# Patient Record
Sex: MALE | Race: WHITE | NOT HISPANIC OR LATINO
[De-identification: names, ages, dates, MRNs, and addresses within clinical notes are randomized per-mention and may not be internally consistent; named-entity substitution may affect disease eponyms.]

---

## 2018-03-16 ENCOUNTER — APPOINTMENT (OUTPATIENT)
Dept: UROLOGY | Facility: CLINIC | Age: 38
End: 2018-03-16

## 2020-12-18 ENCOUNTER — TRANSCRIPTION ENCOUNTER (OUTPATIENT)
Age: 40
End: 2020-12-18

## 2021-03-18 ENCOUNTER — TRANSCRIPTION ENCOUNTER (OUTPATIENT)
Age: 41
End: 2021-03-18

## 2021-04-13 ENCOUNTER — TRANSCRIPTION ENCOUNTER (OUTPATIENT)
Age: 41
End: 2021-04-13

## 2021-04-15 ENCOUNTER — TRANSCRIPTION ENCOUNTER (OUTPATIENT)
Age: 41
End: 2021-04-15

## 2021-04-20 ENCOUNTER — APPOINTMENT (OUTPATIENT)
Dept: UROLOGY | Facility: CLINIC | Age: 41
End: 2021-04-20
Payer: MEDICAID

## 2021-04-20 VITALS — BODY MASS INDEX: 34.95 KG/M2 | WEIGHT: 258 LBS | HEIGHT: 72 IN

## 2021-04-20 DIAGNOSIS — R80.9 PROTEINURIA, UNSPECIFIED: ICD-10-CM

## 2021-04-20 DIAGNOSIS — M54.5 LOW BACK PAIN: ICD-10-CM

## 2021-04-20 DIAGNOSIS — Z78.9 OTHER SPECIFIED HEALTH STATUS: ICD-10-CM

## 2021-04-20 DIAGNOSIS — Z87.891 PERSONAL HISTORY OF NICOTINE DEPENDENCE: ICD-10-CM

## 2021-04-20 PROCEDURE — 99204 OFFICE O/P NEW MOD 45 MIN: CPT

## 2021-04-20 PROCEDURE — 99072 ADDL SUPL MATRL&STAF TM PHE: CPT

## 2021-04-27 LAB
BACTERIA UR CULT: NORMAL
URINE CYTOLOGY: NORMAL

## 2021-06-01 ENCOUNTER — OUTPATIENT (OUTPATIENT)
Dept: OUTPATIENT SERVICES | Facility: HOSPITAL | Age: 41
LOS: 1 days | Discharge: HOME | End: 2021-06-01
Payer: MEDICAID

## 2021-06-01 ENCOUNTER — RESULT REVIEW (OUTPATIENT)
Age: 41
End: 2021-06-01

## 2021-06-01 DIAGNOSIS — R31.29 OTHER MICROSCOPIC HEMATURIA: ICD-10-CM

## 2021-06-01 PROCEDURE — 74178 CT ABD&PLV WO CNTR FLWD CNTR: CPT | Mod: 26

## 2021-06-02 ENCOUNTER — NON-APPOINTMENT (OUTPATIENT)
Age: 41
End: 2021-06-02

## 2021-06-10 ENCOUNTER — APPOINTMENT (OUTPATIENT)
Dept: UROLOGY | Facility: CLINIC | Age: 41
End: 2021-06-10
Payer: MEDICAID

## 2021-06-10 VITALS — BODY MASS INDEX: 34.19 KG/M2 | TEMPERATURE: 98.3 F | WEIGHT: 258 LBS | HEIGHT: 73 IN

## 2021-06-10 DIAGNOSIS — R31.29 OTHER MICROSCOPIC HEMATURIA: ICD-10-CM

## 2021-06-10 PROCEDURE — 99214 OFFICE O/P EST MOD 30 MIN: CPT

## 2021-06-10 PROCEDURE — 99072 ADDL SUPL MATRL&STAF TM PHE: CPT

## 2021-06-22 ENCOUNTER — APPOINTMENT (OUTPATIENT)
Dept: UROLOGY | Facility: CLINIC | Age: 41
End: 2021-06-22
Payer: MEDICAID

## 2021-06-22 PROCEDURE — 99214 OFFICE O/P EST MOD 30 MIN: CPT

## 2021-06-22 PROCEDURE — 99072 ADDL SUPL MATRL&STAF TM PHE: CPT

## 2021-06-22 NOTE — HISTORY OF PRESENT ILLNESS
[FreeTextEntry1] : This is a 40 year old male who is being evaluated for finding of right lower pole renal mass.\par \par He was being followed for microscopic hematuria \par UA on 3/10/21 with > 60 RBC/HPF\par \par Underwent CT on 6/2021 which showed enhancing 3.6 x  3.7 cm right medial lower pole mass along with multiple hypoattenuating hepatic lesions.  He is currently scheduled for an MRI to rule out metastasis.\par 4 mm nonobstructing  left renal calculus also noted.\par \par Negative urine cytology\par \par TT ~30\par On testosterone replacement being managed by his PCP.  He injects 1.5 ml testosterone weekly\par Libido "so so"\par \par \par \par Medical Hx: Low Testosterone, hypothyroidism\par Surgical Hx: lap Gastric sleeve 1 year ago, has lost about 110 lbs\par

## 2021-06-22 NOTE — ASSESSMENT
[FreeTextEntry1] : 40 year old male with recent finding of enhancing 3.7 cm right renal mass\par -MRI to evaluate for metastasis\par -pre-operative labs today\par -chest Xray \par \par We reviewed the possible underlying histology of solid enhancing renal masses, with the majority being malignant (~80%) whereas ~20% are benign (e.g. oncocytoma).  We discussed the role/possibility of percutaneous biopsy, with the associated risks, benefits, complications, and accuracy issues (e.g. risk of false negative results). \par \par The heterogeneous natural history/biology of renal cell carcinoma was discussed, including the fact that while many renal cancers are indolent, others behave aggresssively.  \par \par Options were reviewed including, not limited to, active surveillance (AS), surgical extirpation and ablation.  The risks of tumor growth and metastasis on active surveillance were reviewed, including the fact that metastatic progression on AS could mean missing the opportunity for cure.  The average growth rate of ~2-3 mm/year and metastasis rates of ~2-3% on AS over 5 year interval for small renal masses <4 cm was discussed. \par \par With respect to treatment we reviewed ablation (cryosurgery, radiofrequency ablation), risks of recurrence, opportunities for salvage treatment, and imaging requirements for follow up.  Oncologic outcomes for ablation were reviewed. \par \par With respect to surgery we reviewed nephron sparing surgery vs. radical nephrectomy, as well as open vs. minimally invasive surgical (MIS) approaches (e.g. laparoscopy and robotic assisted laparoscopic surgery).  Personal and institutional experience, as well as other published literature was reviewed.  Oncologic outcomes, renal functional outcomes were compared and contrasted between radical vs. NSS and open vs. MIS surgery.  Risks of acute kidney injury, medical/surgical chronic kidney disease (either exacerbation or new-onset), as well as risks of ESRD development were reviewed.   Risks of conversion from MIS to open surgery discussed.  Risks of converting from attempted partial nephrectomy to radical nephrectomy were discussed.  Risks of surgical complications were reviewed, including not limited to: bleeding//life-threatening hemorrhage, vascular/bowel/adjacent visceral organ injury, trocar/access injury, the possibility of recognized vs. unrecognized/delayed-recognition injury, risks of thermal/blunt/sharp/retraction injury, risks of DVT, PE, MI, death, risks of cardiopulmonary/anesthesia related complications, positional injury, infection/collection/abscess, wound complications/dehiscence/seroma/cellulitis, urinoma/fistula, ureteral injury/obstruction, as well as other complications\par

## 2021-06-23 LAB
ALBUMIN SERPL ELPH-MCNC: 4.7 G/DL
ALP BLD-CCNC: 85 U/L
ALT SERPL-CCNC: 39 U/L
ANION GAP SERPL CALC-SCNC: 11 MMOL/L
APPEARANCE: CLEAR
AST SERPL-CCNC: 30 U/L
BACTERIA: NEGATIVE
BASOPHILS # BLD AUTO: 0.05 K/UL
BASOPHILS NFR BLD AUTO: 0.8 %
BILIRUB SERPL-MCNC: 0.4 MG/DL
BILIRUBIN URINE: NEGATIVE
BLOOD URINE: ABNORMAL
BUN SERPL-MCNC: 9 MG/DL
CALCIUM SERPL-MCNC: 9.6 MG/DL
CHLORIDE SERPL-SCNC: 103 MMOL/L
CO2 SERPL-SCNC: 24 MMOL/L
COLOR: YELLOW
CREAT SERPL-MCNC: 1.06 MG/DL
EOSINOPHIL # BLD AUTO: 0.05 K/UL
EOSINOPHIL NFR BLD AUTO: 0.8 %
GLUCOSE QUALITATIVE U: NEGATIVE
GLUCOSE SERPL-MCNC: 108 MG/DL
HCT VFR BLD CALC: 51.7 %
HGB BLD-MCNC: 16.6 G/DL
HYALINE CASTS: 0 /LPF
IMM GRANULOCYTES NFR BLD AUTO: 0.3 %
KETONES URINE: NEGATIVE
LEUKOCYTE ESTERASE URINE: NEGATIVE
LYMPHOCYTES # BLD AUTO: 1.93 K/UL
LYMPHOCYTES NFR BLD AUTO: 32.8 %
MAN DIFF?: NORMAL
MCHC RBC-ENTMCNC: 29.3 PG
MCHC RBC-ENTMCNC: 32.1 GM/DL
MCV RBC AUTO: 91.2 FL
MICROSCOPIC-UA: NORMAL
MONOCYTES # BLD AUTO: 0.55 K/UL
MONOCYTES NFR BLD AUTO: 9.3 %
NEUTROPHILS # BLD AUTO: 3.29 K/UL
NEUTROPHILS NFR BLD AUTO: 56 %
NITRITE URINE: NEGATIVE
PH URINE: 8
PLATELET # BLD AUTO: 253 K/UL
POTASSIUM SERPL-SCNC: 4.6 MMOL/L
PROT SERPL-MCNC: 7.1 G/DL
PROTEIN URINE: ABNORMAL
RBC # BLD: 5.67 M/UL
RBC # FLD: 14.6 %
RED BLOOD CELLS URINE: 400 /HPF
SODIUM SERPL-SCNC: 138 MMOL/L
SPECIFIC GRAVITY URINE: 1.03
SQUAMOUS EPITHELIAL CELLS: 3 /HPF
UROBILINOGEN URINE: ABNORMAL
WBC # FLD AUTO: 5.89 K/UL
WHITE BLOOD CELLS URINE: 1 /HPF

## 2021-06-24 LAB — BACTERIA UR CULT: NORMAL

## 2021-06-28 ENCOUNTER — APPOINTMENT (OUTPATIENT)
Dept: UROLOGY | Facility: CLINIC | Age: 41
End: 2021-06-28

## 2021-06-28 ENCOUNTER — LABORATORY RESULT (OUTPATIENT)
Age: 41
End: 2021-06-28

## 2021-07-03 ENCOUNTER — OUTPATIENT (OUTPATIENT)
Dept: OUTPATIENT SERVICES | Facility: HOSPITAL | Age: 41
LOS: 1 days | Discharge: HOME | End: 2021-07-03
Payer: MEDICAID

## 2021-07-03 ENCOUNTER — RESULT REVIEW (OUTPATIENT)
Age: 41
End: 2021-07-03

## 2021-07-03 DIAGNOSIS — N28.89 OTHER SPECIFIED DISORDERS OF KIDNEY AND URETER: ICD-10-CM

## 2021-07-03 PROCEDURE — 74183 MRI ABD W/O CNTR FLWD CNTR: CPT | Mod: 26

## 2021-07-03 PROCEDURE — 72197 MRI PELVIS W/O & W/DYE: CPT | Mod: 26

## 2021-07-06 ENCOUNTER — TRANSCRIPTION ENCOUNTER (OUTPATIENT)
Age: 41
End: 2021-07-06

## 2021-07-06 VITALS
TEMPERATURE: 98 F | HEIGHT: 73 IN | OXYGEN SATURATION: 96 % | RESPIRATION RATE: 16 BRPM | DIASTOLIC BLOOD PRESSURE: 78 MMHG | WEIGHT: 271.39 LBS | SYSTOLIC BLOOD PRESSURE: 137 MMHG | HEART RATE: 77 BPM

## 2021-07-07 ENCOUNTER — RESULT REVIEW (OUTPATIENT)
Age: 41
End: 2021-07-07

## 2021-07-07 ENCOUNTER — INPATIENT (INPATIENT)
Facility: HOSPITAL | Age: 41
LOS: 0 days | Discharge: HOME | DRG: 658 | End: 2021-07-08
Attending: UROLOGY | Admitting: UROLOGY
Payer: MEDICAID

## 2021-07-07 ENCOUNTER — APPOINTMENT (OUTPATIENT)
Dept: UROLOGY | Facility: HOSPITAL | Age: 41
End: 2021-07-07

## 2021-07-07 DIAGNOSIS — C64.1 MALIGNANT NEOPLASM OF RIGHT KIDNEY, EXCEPT RENAL PELVIS: ICD-10-CM

## 2021-07-07 DIAGNOSIS — N28.89 OTHER SPECIFIED DISORDERS OF KIDNEY AND URETER: ICD-10-CM

## 2021-07-07 DIAGNOSIS — Z98.84 BARIATRIC SURGERY STATUS: Chronic | ICD-10-CM

## 2021-07-07 DIAGNOSIS — E03.9 HYPOTHYROIDISM, UNSPECIFIED: ICD-10-CM

## 2021-07-07 LAB
ANION GAP SERPL CALC-SCNC: 12 MMOL/L — SIGNIFICANT CHANGE UP (ref 5–17)
BLD GP AB SCN SERPL QL: NEGATIVE — SIGNIFICANT CHANGE UP
BUN SERPL-MCNC: 16 MG/DL — SIGNIFICANT CHANGE UP (ref 7–23)
CALCIUM SERPL-MCNC: 8.5 MG/DL — SIGNIFICANT CHANGE UP (ref 8.4–10.5)
CHLORIDE SERPL-SCNC: 102 MMOL/L — SIGNIFICANT CHANGE UP (ref 96–108)
CO2 SERPL-SCNC: 24 MMOL/L — SIGNIFICANT CHANGE UP (ref 22–31)
CREAT SERPL-MCNC: 1.13 MG/DL — SIGNIFICANT CHANGE UP (ref 0.5–1.3)
GLUCOSE SERPL-MCNC: 99 MG/DL — SIGNIFICANT CHANGE UP (ref 70–99)
HCT VFR BLD CALC: 44.3 % — SIGNIFICANT CHANGE UP (ref 39–50)
HGB BLD-MCNC: 14.6 G/DL — SIGNIFICANT CHANGE UP (ref 13–17)
MAGNESIUM SERPL-MCNC: 1.7 MG/DL — SIGNIFICANT CHANGE UP (ref 1.6–2.6)
MCHC RBC-ENTMCNC: 29.8 PG — SIGNIFICANT CHANGE UP (ref 27–34)
MCHC RBC-ENTMCNC: 33 GM/DL — SIGNIFICANT CHANGE UP (ref 32–36)
MCV RBC AUTO: 90.4 FL — SIGNIFICANT CHANGE UP (ref 80–100)
NRBC # BLD: 0 /100 WBCS — SIGNIFICANT CHANGE UP (ref 0–0)
PHOSPHATE SERPL-MCNC: 2.7 MG/DL — SIGNIFICANT CHANGE UP (ref 2.5–4.5)
PLATELET # BLD AUTO: 230 K/UL — SIGNIFICANT CHANGE UP (ref 150–400)
POTASSIUM SERPL-MCNC: 4.2 MMOL/L — SIGNIFICANT CHANGE UP (ref 3.5–5.3)
POTASSIUM SERPL-SCNC: 4.2 MMOL/L — SIGNIFICANT CHANGE UP (ref 3.5–5.3)
RBC # BLD: 4.9 M/UL — SIGNIFICANT CHANGE UP (ref 4.2–5.8)
RBC # FLD: 14 % — SIGNIFICANT CHANGE UP (ref 10.3–14.5)
RH IG SCN BLD-IMP: POSITIVE — SIGNIFICANT CHANGE UP
SODIUM SERPL-SCNC: 138 MMOL/L — SIGNIFICANT CHANGE UP (ref 135–145)
WBC # BLD: 13.21 K/UL — HIGH (ref 3.8–10.5)
WBC # FLD AUTO: 13.21 K/UL — HIGH (ref 3.8–10.5)

## 2021-07-07 PROCEDURE — 50543 LAPARO PARTIAL NEPHRECTOMY: CPT | Mod: RT

## 2021-07-07 PROCEDURE — 76998 US GUIDE INTRAOP: CPT | Mod: 26

## 2021-07-07 PROCEDURE — 88342 IMHCHEM/IMCYTCHM 1ST ANTB: CPT | Mod: 26

## 2021-07-07 PROCEDURE — 88307 TISSUE EXAM BY PATHOLOGIST: CPT | Mod: 26

## 2021-07-07 PROCEDURE — 50543 LAPARO PARTIAL NEPHRECTOMY: CPT | Mod: AS

## 2021-07-07 PROCEDURE — 88341 IMHCHEM/IMCYTCHM EA ADD ANTB: CPT | Mod: 26

## 2021-07-07 PROCEDURE — S2900 ROBOTIC SURGICAL SYSTEM: CPT

## 2021-07-07 RX ORDER — CEFAZOLIN SODIUM 1 G
1000 VIAL (EA) INJECTION EVERY 8 HOURS
Refills: 0 | Status: DISCONTINUED | OUTPATIENT
Start: 2021-07-07 | End: 2021-07-08

## 2021-07-07 RX ORDER — ONDANSETRON 8 MG/1
8 TABLET, FILM COATED ORAL EVERY 8 HOURS
Refills: 0 | Status: DISCONTINUED | OUTPATIENT
Start: 2021-07-07 | End: 2021-07-07

## 2021-07-07 RX ORDER — SODIUM CHLORIDE 9 MG/ML
1000 INJECTION, SOLUTION INTRAVENOUS
Refills: 0 | Status: DISCONTINUED | OUTPATIENT
Start: 2021-07-07 | End: 2021-07-08

## 2021-07-07 RX ORDER — SENNA PLUS 8.6 MG/1
2 TABLET ORAL AT BEDTIME
Refills: 0 | Status: DISCONTINUED | OUTPATIENT
Start: 2021-07-07 | End: 2021-07-08

## 2021-07-07 RX ORDER — GABAPENTIN 400 MG/1
300 CAPSULE ORAL ONCE
Refills: 0 | Status: COMPLETED | OUTPATIENT
Start: 2021-07-07 | End: 2021-07-07

## 2021-07-07 RX ORDER — ENOXAPARIN SODIUM 100 MG/ML
40 INJECTION SUBCUTANEOUS ONCE
Refills: 0 | Status: COMPLETED | OUTPATIENT
Start: 2021-07-07 | End: 2021-07-07

## 2021-07-07 RX ORDER — CEFAZOLIN SODIUM 1 G
1000 VIAL (EA) INJECTION EVERY 8 HOURS
Refills: 0 | Status: DISCONTINUED | OUTPATIENT
Start: 2021-07-07 | End: 2021-07-07

## 2021-07-07 RX ORDER — ENOXAPARIN SODIUM 100 MG/ML
40 INJECTION SUBCUTANEOUS DAILY
Refills: 0 | Status: DISCONTINUED | OUTPATIENT
Start: 2021-07-08 | End: 2021-07-08

## 2021-07-07 RX ORDER — ONDANSETRON 8 MG/1
4 TABLET, FILM COATED ORAL EVERY 8 HOURS
Refills: 0 | Status: DISCONTINUED | OUTPATIENT
Start: 2021-07-07 | End: 2021-07-08

## 2021-07-07 RX ORDER — HYDROMORPHONE HYDROCHLORIDE 2 MG/ML
0.5 INJECTION INTRAMUSCULAR; INTRAVENOUS; SUBCUTANEOUS ONCE
Refills: 0 | Status: DISCONTINUED | OUTPATIENT
Start: 2021-07-07 | End: 2021-07-07

## 2021-07-07 RX ORDER — HYDROMORPHONE HYDROCHLORIDE 2 MG/ML
0.5 INJECTION INTRAMUSCULAR; INTRAVENOUS; SUBCUTANEOUS EVERY 6 HOURS
Refills: 0 | Status: DISCONTINUED | OUTPATIENT
Start: 2021-07-07 | End: 2021-07-07

## 2021-07-07 RX ORDER — ACETAMINOPHEN 500 MG
1000 TABLET ORAL EVERY 6 HOURS
Refills: 0 | Status: DISCONTINUED | OUTPATIENT
Start: 2021-07-07 | End: 2021-07-08

## 2021-07-07 RX ORDER — OXYCODONE HYDROCHLORIDE 5 MG/1
5 TABLET ORAL EVERY 4 HOURS
Refills: 0 | Status: DISCONTINUED | OUTPATIENT
Start: 2021-07-07 | End: 2021-07-08

## 2021-07-07 RX ORDER — ACETAMINOPHEN 500 MG
1000 TABLET ORAL ONCE
Refills: 0 | Status: COMPLETED | OUTPATIENT
Start: 2021-07-07 | End: 2021-07-07

## 2021-07-07 RX ADMIN — GABAPENTIN 300 MILLIGRAM(S): 400 CAPSULE ORAL at 14:21

## 2021-07-07 RX ADMIN — ENOXAPARIN SODIUM 40 MILLIGRAM(S): 100 INJECTION SUBCUTANEOUS at 14:21

## 2021-07-07 RX ADMIN — Medication 1000 MILLIGRAM(S): at 19:41

## 2021-07-07 RX ADMIN — SENNA PLUS 2 TABLET(S): 8.6 TABLET ORAL at 21:28

## 2021-07-07 RX ADMIN — SODIUM CHLORIDE 130 MILLILITER(S): 9 INJECTION, SOLUTION INTRAVENOUS at 19:23

## 2021-07-07 RX ADMIN — Medication 1000 MILLIGRAM(S): at 14:24

## 2021-07-07 RX ADMIN — Medication 1000 MILLIGRAM(S): at 14:20

## 2021-07-07 RX ADMIN — HYDROMORPHONE HYDROCHLORIDE 0.5 MILLIGRAM(S): 2 INJECTION INTRAMUSCULAR; INTRAVENOUS; SUBCUTANEOUS at 23:00

## 2021-07-07 RX ADMIN — OXYCODONE HYDROCHLORIDE 5 MILLIGRAM(S): 5 TABLET ORAL at 19:41

## 2021-07-07 RX ADMIN — HYDROMORPHONE HYDROCHLORIDE 0.5 MILLIGRAM(S): 2 INJECTION INTRAMUSCULAR; INTRAVENOUS; SUBCUTANEOUS at 19:23

## 2021-07-07 RX ADMIN — Medication 100 MILLIGRAM(S): at 22:29

## 2021-07-08 ENCOUNTER — TRANSCRIPTION ENCOUNTER (OUTPATIENT)
Age: 41
End: 2021-07-08

## 2021-07-08 VITALS
OXYGEN SATURATION: 95 % | RESPIRATION RATE: 18 BRPM | DIASTOLIC BLOOD PRESSURE: 67 MMHG | HEART RATE: 85 BPM | SYSTOLIC BLOOD PRESSURE: 114 MMHG | TEMPERATURE: 98 F

## 2021-07-08 LAB
ANION GAP SERPL CALC-SCNC: 11 MMOL/L — SIGNIFICANT CHANGE UP (ref 5–17)
BUN SERPL-MCNC: 13 MG/DL — SIGNIFICANT CHANGE UP (ref 7–23)
CALCIUM SERPL-MCNC: 8.9 MG/DL — SIGNIFICANT CHANGE UP (ref 8.4–10.5)
CHLORIDE SERPL-SCNC: 99 MMOL/L — SIGNIFICANT CHANGE UP (ref 96–108)
CO2 SERPL-SCNC: 24 MMOL/L — SIGNIFICANT CHANGE UP (ref 22–31)
COVID-19 SPIKE DOMAIN AB INTERP: POSITIVE
COVID-19 SPIKE DOMAIN ANTIBODY RESULT: >250 U/ML — HIGH
CREAT SERPL-MCNC: 1.15 MG/DL — SIGNIFICANT CHANGE UP (ref 0.5–1.3)
GLUCOSE SERPL-MCNC: 145 MG/DL — HIGH (ref 70–99)
HCT VFR BLD CALC: 44.4 % — SIGNIFICANT CHANGE UP (ref 39–50)
HGB BLD-MCNC: 14.5 G/DL — SIGNIFICANT CHANGE UP (ref 13–17)
MCHC RBC-ENTMCNC: 29.2 PG — SIGNIFICANT CHANGE UP (ref 27–34)
MCHC RBC-ENTMCNC: 32.7 GM/DL — SIGNIFICANT CHANGE UP (ref 32–36)
MCV RBC AUTO: 89.5 FL — SIGNIFICANT CHANGE UP (ref 80–100)
NRBC # BLD: 0 /100 WBCS — SIGNIFICANT CHANGE UP (ref 0–0)
PLATELET # BLD AUTO: 265 K/UL — SIGNIFICANT CHANGE UP (ref 150–400)
POTASSIUM SERPL-MCNC: 5.3 MMOL/L — SIGNIFICANT CHANGE UP (ref 3.5–5.3)
POTASSIUM SERPL-SCNC: 5.3 MMOL/L — SIGNIFICANT CHANGE UP (ref 3.5–5.3)
RBC # BLD: 4.96 M/UL — SIGNIFICANT CHANGE UP (ref 4.2–5.8)
RBC # FLD: 13.8 % — SIGNIFICANT CHANGE UP (ref 10.3–14.5)
SARS-COV-2 IGG+IGM SERPL QL IA: >250 U/ML — HIGH
SARS-COV-2 IGG+IGM SERPL QL IA: POSITIVE
SODIUM SERPL-SCNC: 134 MMOL/L — LOW (ref 135–145)
WBC # BLD: 10.52 K/UL — HIGH (ref 3.8–10.5)
WBC # FLD AUTO: 10.52 K/UL — HIGH (ref 3.8–10.5)

## 2021-07-08 PROCEDURE — S2900: CPT

## 2021-07-08 PROCEDURE — 88341 IMHCHEM/IMCYTCHM EA ADD ANTB: CPT

## 2021-07-08 PROCEDURE — 85027 COMPLETE CBC AUTOMATED: CPT

## 2021-07-08 PROCEDURE — C1889: CPT

## 2021-07-08 PROCEDURE — 86900 BLOOD TYPING SEROLOGIC ABO: CPT

## 2021-07-08 PROCEDURE — 88307 TISSUE EXAM BY PATHOLOGIST: CPT

## 2021-07-08 PROCEDURE — 86901 BLOOD TYPING SEROLOGIC RH(D): CPT

## 2021-07-08 PROCEDURE — 36415 COLL VENOUS BLD VENIPUNCTURE: CPT

## 2021-07-08 PROCEDURE — 86769 SARS-COV-2 COVID-19 ANTIBODY: CPT

## 2021-07-08 PROCEDURE — 83735 ASSAY OF MAGNESIUM: CPT

## 2021-07-08 PROCEDURE — 84100 ASSAY OF PHOSPHORUS: CPT

## 2021-07-08 PROCEDURE — 86850 RBC ANTIBODY SCREEN: CPT

## 2021-07-08 PROCEDURE — 80048 BASIC METABOLIC PNL TOTAL CA: CPT

## 2021-07-08 RX ORDER — OXYCODONE HYDROCHLORIDE 5 MG/1
1 TABLET ORAL
Qty: 10 | Refills: 0
Start: 2021-07-08

## 2021-07-08 RX ORDER — OXYCODONE HYDROCHLORIDE 5 MG/1
5 TABLET ORAL ONCE
Refills: 0 | Status: DISCONTINUED | OUTPATIENT
Start: 2021-07-08 | End: 2021-07-08

## 2021-07-08 RX ORDER — DOCUSATE SODIUM 100 MG
1 CAPSULE ORAL
Qty: 18 | Refills: 0
Start: 2021-07-08 | End: 2021-07-13

## 2021-07-08 RX ORDER — LEVOTHYROXINE SODIUM 125 MCG
100 TABLET ORAL DAILY
Refills: 0 | Status: DISCONTINUED | OUTPATIENT
Start: 2021-07-08 | End: 2021-07-08

## 2021-07-08 RX ORDER — TRAMADOL HYDROCHLORIDE 50 MG/1
1 TABLET ORAL
Qty: 10 | Refills: 0
Start: 2021-07-08

## 2021-07-08 RX ADMIN — Medication 1000 MILLIGRAM(S): at 02:16

## 2021-07-08 RX ADMIN — OXYCODONE HYDROCHLORIDE 5 MILLIGRAM(S): 5 TABLET ORAL at 02:12

## 2021-07-08 RX ADMIN — OXYCODONE HYDROCHLORIDE 5 MILLIGRAM(S): 5 TABLET ORAL at 10:28

## 2021-07-08 RX ADMIN — OXYCODONE HYDROCHLORIDE 5 MILLIGRAM(S): 5 TABLET ORAL at 11:10

## 2021-07-08 RX ADMIN — Medication 1000 MILLIGRAM(S): at 01:10

## 2021-07-08 RX ADMIN — OXYCODONE HYDROCHLORIDE 5 MILLIGRAM(S): 5 TABLET ORAL at 06:25

## 2021-07-08 RX ADMIN — OXYCODONE HYDROCHLORIDE 5 MILLIGRAM(S): 5 TABLET ORAL at 08:00

## 2021-07-08 RX ADMIN — Medication 1000 MILLIGRAM(S): at 08:00

## 2021-07-08 RX ADMIN — Medication 100 MILLIGRAM(S): at 06:30

## 2021-07-08 RX ADMIN — OXYCODONE HYDROCHLORIDE 5 MILLIGRAM(S): 5 TABLET ORAL at 03:04

## 2021-07-08 RX ADMIN — Medication 1000 MILLIGRAM(S): at 07:02

## 2021-07-08 RX ADMIN — Medication 100 MICROGRAM(S): at 05:30

## 2021-07-08 RX ADMIN — HYDROMORPHONE HYDROCHLORIDE 0.5 MILLIGRAM(S): 2 INJECTION INTRAMUSCULAR; INTRAVENOUS; SUBCUTANEOUS at 00:32

## 2021-07-08 RX ADMIN — Medication 1000 MILLIGRAM(S): at 13:50

## 2021-07-08 RX ADMIN — Medication 1000 MILLIGRAM(S): at 13:19

## 2021-07-08 NOTE — DISCHARGE NOTE NURSING/CASE MANAGEMENT/SOCIAL WORK - PATIENT PORTAL LINK FT
You can access the FollowMyHealth Patient Portal offered by [facility] by registering at the following website: [email or web address]. By joining [facility]’s FollowMyHealth portal, you will also be able to view your health information using other applications (apps) compatible with our system.

## 2021-07-08 NOTE — PROGRESS NOTE ADULT - SUBJECTIVE AND OBJECTIVE BOX
UROLOGY POST OP NOTE (PAGER # [phone])    PROCEDURE: robotic right partial nephrectomy    T(C): 36.2 (07-07-21 @ 19:12), Max: 36.2 (07-07-21 @ 19:12)  HR: 89 (07-07-21 @ 20:30) (82 - 89)  BP: 113/68 (07-07-21 @ 20:30) (105/61 - 124/55)  RR: 20 (07-07-21 @ 20:30) (11 - 20)  SpO2: 100% (07-07-21 @ 20:30) (100% - 100%)  Wt(kg): --  UO: 95 cc    SUBJECTIVE: pain controlled. No N/V. No CP/SOB.    ON PE: alert and awake    Abdomen: soft, incision sites clean and dry    : FC intact, urine clear                          14.6   13.21 )-----------( 230      ( 07 Jul 2021 20:20 )             44.3     07-07    138  |  102  |  16  ----------------------------<  99  4.2   |  24  |  1.13    Ca    8.5      07 Jul 2021 20:20  Phos  2.7     07-07  Mg     1.7     07-07         
INTERVAL HPI/OVERNIGHT EVENTS:  No acute events overnight.    VITALS:    T(F): 98.1 (07-08-21 @ 00:18), Max: 98.1 (07-08-21 @ 00:18)  HR: 86 (07-08-21 @ 00:18) (82 - 89)  BP: 121/73 (07-08-21 @ 00:18) (105/61 - 131/73)  RR: 18 (07-08-21 @ 00:18) (11 - 20)  SpO2: 94% (07-08-21 @ 00:18) (94% - 100%)  Wt(kg): --    I&O's Detail    07 Jul 2021 07:01  -  08 Jul 2021 05:48  --------------------------------------------------------  IN:  Total IN: 0 mL    OUT:    Voided (mL): 95 mL  Total OUT: 95 mL    Total NET: -95 mL          MEDICATIONS:    ANTIBIOTICS:  ceFAZolin   IVPB 1000 milliGRAM(s) IV Intermittent every 8 hours      PAIN CONTROL:  acetaminophen   Tablet .. 1000 milliGRAM(s) Oral every 6 hours  ondansetron Injectable 4 milliGRAM(s) IV Push every 8 hours PRN  oxyCODONE    IR 5 milliGRAM(s) Oral every 4 hours PRN       MEDS:      HEME/ONC  enoxaparin Injectable 40 milliGRAM(s) SubCutaneous daily        PHYSICAL EXAM:  General: No acute distress.  Alert and Oriented  Abdominal Exam: soft, incision sites clean and dry   Exam: FC intact, urine clear      LABS:                        14.6   13.21 )-----------( 230      ( 07 Jul 2021 20:20 )             44.3     07-07    138  |  102  |  16  ----------------------------<  99  4.2   |  24  |  1.13    Ca    8.5      07 Jul 2021 20:20  Phos  2.7     07-07  Mg     1.7     07-07            RADIOLOGY & ADDITIONAL TESTS:

## 2021-07-08 NOTE — DISCHARGE NOTE PROVIDER - CARE PROVIDER_API CALL
[place])  Urology  [address] [address]  Phone: [phone]  Fax: [phone]  Follow Up Time:

## 2021-07-08 NOTE — DISCHARGE NOTE PROVIDER - NSDCCPCAREPLAN_GEN_ALL_CORE_FT
PRINCIPAL DISCHARGE DIAGNOSIS  Diagnosis: Renal mass  Assessment and Plan of Treatment:       SECONDARY DISCHARGE DIAGNOSES  Diagnosis: Hypothyroidism  Assessment and Plan of Treatment:

## 2021-07-08 NOTE — DISCHARGE NOTE PROVIDER - NSDCFUADDINST_GEN_ALL_CORE_FT
Advance diet with BM. Activity as tolerated. Keep incisions clean, dry and intact. If fever >100.4 or any change or worsening of symptoms please call doctor or report to ED. Make follow up appointment with Dr. Sargent. Please use tylenol/NSAIDS as needed for pain

## 2021-07-08 NOTE — PROGRESS NOTE ADULT - PROBLEM SELECTOR PLAN 1
-stable  -OOB  -IS, SCD's  -Diet: clears  -Antibx: ancef  -I's & O's  -pain control  -IVF's  -continue jose
-stable  -OOB  -SCD's, IS  -f/u labs  -possible TOV this am  -continue present care

## 2021-07-08 NOTE — DISCHARGE NOTE PROVIDER - NSDCMRMEDTOKEN_GEN_ALL_CORE_FT
Colace 100 mg oral capsule: 1 cap(s) orally 3 times a day, As Needed -for constipation MDD:Please take one tab up to three times a day as needed for constipation   Synthroid 100 mcg (0.1 mg) oral tablet: 1 tab(s) orally once a day   Colace 100 mg oral capsule: 1 cap(s) orally 3 times a day, As Needed -for constipation MDD:Please take one tab up to three times a day as needed for constipation   oxyCODONE 5 mg oral capsule: 1 cap(s) orally every 6 hours, As Needed -for moderate pain MDD:Please take one tab up to every 6 hours as needed for severe pain   Synthroid 100 mcg (0.1 mg) oral tablet: 1 tab(s) orally once a day

## 2021-07-09 PROBLEM — E07.9 DISORDER OF THYROID, UNSPECIFIED: Chronic | Status: ACTIVE | Noted: 2021-07-06

## 2021-07-09 PROBLEM — N28.89 OTHER SPECIFIED DISORDERS OF KIDNEY AND URETER: Chronic | Status: ACTIVE | Noted: 2021-07-06

## 2021-07-09 PROBLEM — C64.9 MALIGNANT NEOPLASM OF UNSPECIFIED KIDNEY, EXCEPT RENAL PELVIS: Chronic | Status: ACTIVE | Noted: 2021-07-06

## 2021-07-09 PROBLEM — G47.33 OBSTRUCTIVE SLEEP APNEA (ADULT) (PEDIATRIC): Chronic | Status: ACTIVE | Noted: 2021-07-06

## 2021-07-22 ENCOUNTER — APPOINTMENT (OUTPATIENT)
Dept: UROLOGY | Facility: CLINIC | Age: 41
End: 2021-07-22
Payer: MEDICAID

## 2021-07-22 VITALS
HEART RATE: 82 BPM | DIASTOLIC BLOOD PRESSURE: 83 MMHG | TEMPERATURE: 98 F | SYSTOLIC BLOOD PRESSURE: 132 MMHG | OXYGEN SATURATION: 98 %

## 2021-07-22 PROCEDURE — 99024 POSTOP FOLLOW-UP VISIT: CPT

## 2021-07-22 NOTE — ASSESSMENT
[FreeTextEntry1] : 40 year old male s/p right partial nephrectomy \par -pathology reviewed with patient, plan on CT in 6 months for surveillance imaging \par -Renal US given stone history and mild left flank pain to r/o obstructing stone, PVR was 2 cc \par -dip in office shows moderate blood, will send UA \par \par \par \par Can follow up with Dr. Parra in 6 months after surveillance CT

## 2021-07-22 NOTE — PHYSICAL EXAM
[General Appearance - Well Developed] : well developed [General Appearance - Well Nourished] : well nourished [Normal Appearance] : normal appearance [Well Groomed] : well groomed [General Appearance - In No Acute Distress] : no acute distress [Abdomen Soft] : soft [Abdomen Tenderness] : non-tender [Costovertebral Angle Tenderness] : no ~M costovertebral angle tenderness [No Focal Deficits] : no focal deficits

## 2021-07-22 NOTE — HISTORY OF PRESENT ILLNESS
[FreeTextEntry1] : 40 year old male s/p partial nephrectomy on 7/7/21\par Doing well since surgery\par \par No pleuritic pain, SOB, calf pain or swelling\par Passing gas and moving bowels, tolerating diet\par \par Voiding without difficulty reports "dark light ice tea color" urine in am that clears throughout the day.\par \par CT prior to surgery noted 4 mm left renal stone \par \par \par \par

## 2021-07-23 ENCOUNTER — RESULT REVIEW (OUTPATIENT)
Age: 41
End: 2021-07-23

## 2021-07-27 LAB — SURGICAL PATHOLOGY STUDY: SIGNIFICANT CHANGE UP

## 2021-07-28 LAB
APPEARANCE: CLEAR
BACTERIA: NEGATIVE
BILIRUBIN URINE: NEGATIVE
BLOOD URINE: ABNORMAL
COLOR: YELLOW
GLUCOSE QUALITATIVE U: NEGATIVE
HYALINE CASTS: 0 /LPF
KETONES URINE: NEGATIVE
LEUKOCYTE ESTERASE URINE: NEGATIVE
MICROSCOPIC-UA: NORMAL
NITRITE URINE: NEGATIVE
PH URINE: 5.5
PROTEIN URINE: NEGATIVE
RED BLOOD CELLS URINE: 97 /HPF
SPECIFIC GRAVITY URINE: 1.02
SQUAMOUS EPITHELIAL CELLS: 0 /HPF
UROBILINOGEN URINE: NORMAL
WHITE BLOOD CELLS URINE: 1 /HPF

## 2021-08-02 ENCOUNTER — NON-APPOINTMENT (OUTPATIENT)
Age: 41
End: 2021-08-02

## 2021-08-02 ENCOUNTER — OUTPATIENT (OUTPATIENT)
Dept: OUTPATIENT SERVICES | Facility: HOSPITAL | Age: 41
LOS: 1 days | Discharge: HOME | End: 2021-08-02
Payer: MEDICAID

## 2021-08-02 DIAGNOSIS — N28.89 OTHER SPECIFIED DISORDERS OF KIDNEY AND URETER: ICD-10-CM

## 2021-08-02 DIAGNOSIS — N20.0 CALCULUS OF KIDNEY: ICD-10-CM

## 2021-08-02 DIAGNOSIS — Z98.84 BARIATRIC SURGERY STATUS: Chronic | ICD-10-CM

## 2021-08-02 PROCEDURE — 76775 US EXAM ABDO BACK WALL LIM: CPT | Mod: 26

## 2021-10-22 ENCOUNTER — INPATIENT (INPATIENT)
Facility: HOSPITAL | Age: 41
LOS: 4 days | Discharge: HOME HEALTH | End: 2021-10-27
Attending: NEUROLOGICAL SURGERY | Admitting: NEUROLOGICAL SURGERY
Payer: MEDICAID

## 2021-10-22 VITALS
DIASTOLIC BLOOD PRESSURE: 81 MMHG | HEIGHT: 73 IN | SYSTOLIC BLOOD PRESSURE: 131 MMHG | TEMPERATURE: 97 F | HEART RATE: 88 BPM | RESPIRATION RATE: 18 BRPM | OXYGEN SATURATION: 97 % | WEIGHT: 274.92 LBS

## 2021-10-22 DIAGNOSIS — Z98.84 BARIATRIC SURGERY STATUS: Chronic | ICD-10-CM

## 2021-10-22 LAB
ALBUMIN SERPL ELPH-MCNC: 4.3 G/DL — SIGNIFICANT CHANGE UP (ref 3.5–5.2)
ALP SERPL-CCNC: 56 U/L — SIGNIFICANT CHANGE UP (ref 30–115)
ALT FLD-CCNC: 26 U/L — SIGNIFICANT CHANGE UP (ref 0–41)
ANION GAP SERPL CALC-SCNC: 10 MMOL/L — SIGNIFICANT CHANGE UP (ref 7–14)
AST SERPL-CCNC: 32 U/L — SIGNIFICANT CHANGE UP (ref 0–41)
BASOPHILS # BLD AUTO: 0.02 K/UL — SIGNIFICANT CHANGE UP (ref 0–0.2)
BASOPHILS NFR BLD AUTO: 0.3 % — SIGNIFICANT CHANGE UP (ref 0–1)
BILIRUB SERPL-MCNC: 0.8 MG/DL — SIGNIFICANT CHANGE UP (ref 0.2–1.2)
BUN SERPL-MCNC: 15 MG/DL — SIGNIFICANT CHANGE UP (ref 10–20)
CALCIUM SERPL-MCNC: 9.3 MG/DL — SIGNIFICANT CHANGE UP (ref 8.5–10.1)
CHLORIDE SERPL-SCNC: 101 MMOL/L — SIGNIFICANT CHANGE UP (ref 98–110)
CO2 SERPL-SCNC: 25 MMOL/L — SIGNIFICANT CHANGE UP (ref 17–32)
CREAT SERPL-MCNC: 1.1 MG/DL — SIGNIFICANT CHANGE UP (ref 0.7–1.5)
EOSINOPHIL # BLD AUTO: 0.08 K/UL — SIGNIFICANT CHANGE UP (ref 0–0.7)
EOSINOPHIL NFR BLD AUTO: 1.3 % — SIGNIFICANT CHANGE UP (ref 0–8)
GLUCOSE SERPL-MCNC: 92 MG/DL — SIGNIFICANT CHANGE UP (ref 70–99)
HCT VFR BLD CALC: 47.9 % — SIGNIFICANT CHANGE UP (ref 42–52)
HGB BLD-MCNC: 15.7 G/DL — SIGNIFICANT CHANGE UP (ref 14–18)
IMM GRANULOCYTES NFR BLD AUTO: 0.2 % — SIGNIFICANT CHANGE UP (ref 0.1–0.3)
LYMPHOCYTES # BLD AUTO: 1.58 K/UL — SIGNIFICANT CHANGE UP (ref 1.2–3.4)
LYMPHOCYTES # BLD AUTO: 25.9 % — SIGNIFICANT CHANGE UP (ref 20.5–51.1)
MCHC RBC-ENTMCNC: 29.1 PG — SIGNIFICANT CHANGE UP (ref 27–31)
MCHC RBC-ENTMCNC: 32.8 G/DL — SIGNIFICANT CHANGE UP (ref 32–37)
MCV RBC AUTO: 88.7 FL — SIGNIFICANT CHANGE UP (ref 80–94)
MONOCYTES # BLD AUTO: 0.37 K/UL — SIGNIFICANT CHANGE UP (ref 0.1–0.6)
MONOCYTES NFR BLD AUTO: 6.1 % — SIGNIFICANT CHANGE UP (ref 1.7–9.3)
NEUTROPHILS # BLD AUTO: 4.03 K/UL — SIGNIFICANT CHANGE UP (ref 1.4–6.5)
NEUTROPHILS NFR BLD AUTO: 66.2 % — SIGNIFICANT CHANGE UP (ref 42.2–75.2)
NRBC # BLD: 0 /100 WBCS — SIGNIFICANT CHANGE UP (ref 0–0)
PLATELET # BLD AUTO: 209 K/UL — SIGNIFICANT CHANGE UP (ref 130–400)
POTASSIUM SERPL-MCNC: 4.4 MMOL/L — SIGNIFICANT CHANGE UP (ref 3.5–5)
POTASSIUM SERPL-SCNC: 4.4 MMOL/L — SIGNIFICANT CHANGE UP (ref 3.5–5)
PROT SERPL-MCNC: 6.6 G/DL — SIGNIFICANT CHANGE UP (ref 6–8)
RBC # BLD: 5.4 M/UL — SIGNIFICANT CHANGE UP (ref 4.7–6.1)
RBC # FLD: 14.4 % — SIGNIFICANT CHANGE UP (ref 11.5–14.5)
SARS-COV-2 RNA SPEC QL NAA+PROBE: SIGNIFICANT CHANGE UP
SODIUM SERPL-SCNC: 136 MMOL/L — SIGNIFICANT CHANGE UP (ref 135–146)
WBC # BLD: 6.09 K/UL — SIGNIFICANT CHANGE UP (ref 4.8–10.8)
WBC # FLD AUTO: 6.09 K/UL — SIGNIFICANT CHANGE UP (ref 4.8–10.8)

## 2021-10-22 PROCEDURE — 72100 X-RAY EXAM L-S SPINE 2/3 VWS: CPT | Mod: 26

## 2021-10-22 PROCEDURE — 99282 EMERGENCY DEPT VISIT SF MDM: CPT

## 2021-10-22 PROCEDURE — 99285 EMERGENCY DEPT VISIT HI MDM: CPT

## 2021-10-22 RX ORDER — MORPHINE SULFATE 50 MG/1
4 CAPSULE, EXTENDED RELEASE ORAL ONCE
Refills: 0 | Status: DISCONTINUED | OUTPATIENT
Start: 2021-10-22 | End: 2021-10-22

## 2021-10-22 RX ORDER — OXYCODONE AND ACETAMINOPHEN 5; 325 MG/1; MG/1
2 TABLET ORAL ONCE
Refills: 0 | Status: DISCONTINUED | OUTPATIENT
Start: 2021-10-22 | End: 2021-10-22

## 2021-10-22 RX ORDER — LEVOTHYROXINE SODIUM 125 MCG
1 TABLET ORAL
Qty: 0 | Refills: 0 | DISCHARGE

## 2021-10-22 RX ORDER — DEXAMETHASONE 0.5 MG/5ML
10 ELIXIR ORAL ONCE
Refills: 0 | Status: COMPLETED | OUTPATIENT
Start: 2021-10-22 | End: 2021-10-22

## 2021-10-22 RX ORDER — MORPHINE SULFATE 50 MG/1
6 CAPSULE, EXTENDED RELEASE ORAL ONCE
Refills: 0 | Status: DISCONTINUED | OUTPATIENT
Start: 2021-10-22 | End: 2021-10-22

## 2021-10-22 RX ORDER — KETOROLAC TROMETHAMINE 30 MG/ML
15 SYRINGE (ML) INJECTION ONCE
Refills: 0 | Status: DISCONTINUED | OUTPATIENT
Start: 2021-10-22 | End: 2021-10-22

## 2021-10-22 RX ORDER — KETOROLAC TROMETHAMINE 30 MG/ML
30 SYRINGE (ML) INJECTION ONCE
Refills: 0 | Status: DISCONTINUED | OUTPATIENT
Start: 2021-10-22 | End: 2021-10-22

## 2021-10-22 RX ADMIN — MORPHINE SULFATE 6 MILLIGRAM(S): 50 CAPSULE, EXTENDED RELEASE ORAL at 15:10

## 2021-10-22 RX ADMIN — MORPHINE SULFATE 6 MILLIGRAM(S): 50 CAPSULE, EXTENDED RELEASE ORAL at 14:55

## 2021-10-22 RX ADMIN — OXYCODONE AND ACETAMINOPHEN 2 TABLET(S): 5; 325 TABLET ORAL at 13:40

## 2021-10-22 RX ADMIN — Medication 30 MILLIGRAM(S): at 14:15

## 2021-10-22 RX ADMIN — Medication 10 MILLIGRAM(S): at 13:45

## 2021-10-22 RX ADMIN — MORPHINE SULFATE 6 MILLIGRAM(S): 50 CAPSULE, EXTENDED RELEASE ORAL at 19:47

## 2021-10-22 RX ADMIN — OXYCODONE AND ACETAMINOPHEN 2 TABLET(S): 5; 325 TABLET ORAL at 14:30

## 2021-10-22 RX ADMIN — Medication 15 MILLIGRAM(S): at 23:48

## 2021-10-22 RX ADMIN — MORPHINE SULFATE 4 MILLIGRAM(S): 50 CAPSULE, EXTENDED RELEASE ORAL at 23:48

## 2021-10-22 RX ADMIN — Medication 30 MILLIGRAM(S): at 13:45

## 2021-10-22 NOTE — ED ADULT NURSE REASSESSMENT NOTE - NS ED NURSE REASSESS COMMENT FT1
pt biba from [facility] - alert and oriented x 4 - resting comfortably and eating at this time awaiting neuro consult for back pain and b/l le weakness for weeks since nephrectomy.  Will continue to monitor and assess. No further interventions at this time

## 2021-10-22 NOTE — CONSULT NOTE ADULT - SUBJECTIVE AND OBJECTIVE BOX
Patient is a 41y old  Male who presents with a chief complaint of back pain    HPI:   42 y/o male with hx of sciatica x 15 yrs and partial nephrectomy in July 2021, who presents with R back pain. Pt states that 3 days after the surgery, he  started noticing worsening R back pain with radiation to his R leg. Initially, walking alleviated the pain, however he began experiencing the pain intensifying x 2 weeks. Today, patient was exercising/stretching and felt a "pop" in the region of his lower R back and was unable to get up from the floor. The pain was associated with radiation to his R lower leg.   This morning patient felt that he had a slight difficulty with urination, however was able to urinate in the ED. Denies saddle anesthesia or bowel/bladder incontinence. Denies fever, recent fall or injury,  weight loss, and hx of IV drug use.   Patient reports that he went to ER a few days ago, had CT and duplex study which were normal. Also saw Dr. Sauer neurologist earlier this month and had MRI and EEG scheduled at the end of November.    PAST MEDICAL & SURGICAL HISTORY:  HAHN (obstructive sleep apnea)    Thyroid disease    Kidney mass    Renal cell cancer    H/O bariatric surgery  gastric sleeve      FAMILY HISTORY:      SOCIAL HISTORY:  Tobacco Use:  EtOH use:   Substance:    Allergies    penicillin (Unknown)    Intolerances        REVIEW OF SYSTEMS  Negative except as noted in HPI  CONSTITUTIONAL: No fever, weight loss, or fatigue  EYES: No eye pain, visual disturbances, or discharge  ENMT:  No difficulty hearing, tinnitus, vertigo; No sinus or throat pain  NECK: No pain or stiffness  BREASTS: No pain, masses, or nipple discharge  RESPIRATORY: No cough, wheezing, chills or hemoptysis; No shortness of breath  CARDIOVASCULAR: No chest pain, palpitations, dizziness, or leg swelling  GASTROINTESTINAL: No abdominal or epigastric pain. No nausea, vomiting, or hematemesis; No diarrhea or constipation. No melena or hematochezia.  GENITOURINARY: No dysuria, frequency, hematuria, or incontinence  NEUROLOGICAL: No headaches, memory loss, loss of strength, numbness, or tremors  SKIN: No itching, burning, rashes, or lesions   LYMPH NODES: No enlarged glands  ENDOCRINE: No heat or cold intolerance; No hair loss  MUSCULOSKELETAL: + muscle pain, +back pain  PSYCHIATRIC: No depression, anxiety, mood swings, or difficulty sleeping  HEME/LYMPH: No easy bruising, or bleeding gums  ALLERY AND IMMUNOLOGIC: No hives or eczema    HOME MEDICATIONS:  Home Medications:  naproxen 250 mg oral tablet: 1 tab(s) orally 2 times a day (22 Oct 2021 12:39)  Synthroid 100 mcg (0.1 mg) oral tablet: 1 tab(s) orally once a day (22 Oct 2021 12:39)  tiZANidine 2 mg oral tablet: 2 tab(s) orally every 8 hours (22 Oct 2021 12:39)      MEDICATIONS:  Antibiotics:    Neuro:    Anticoagulation:    OTHER:    IVF:      Vital Signs Last 24 Hrs  T(C): 36.2 (22 Oct 2021 21:13), Max: 36.2 (22 Oct 2021 21:13)  T(F): 97.2 (22 Oct 2021 21:13), Max: 97.2 (22 Oct 2021 21:13)  HR: 98 (22 Oct 2021 21:13) (86 - 98)  BP: 147/83 (22 Oct 2021 21:13) (129/81 - 147/88)  BP(mean): --  RR: 18 (22 Oct 2021 21:13) (17 - 18)  SpO2: 97% (22 Oct 2021 21:13) (96% - 98%)      PHYSICAL EXAM:  GENERAL: NAD, well-groomed, well-developed  HEAD:  Atraumatic, normocephalic  EYES: Conjunctiva and sclera clear; corneal reflex intact  ENMT: moist mucous membranes  NECK: Supple, no JVD  MENTAL STATUS: AAO x3; Opens eyes spontaneously; Appropriately conversant without aphasia; following commands;  CRANIAL NERVES: Visual acuity normal for age, visual fields full to confrontation, PERRL. EOMI without nystagmus. Facial sensation intact V1-3 distribution b/l. Face symmetric w/ normal eye closure and smile, tongue midline. Hearing grossly intact. Speech clear. Head turning and shoulder shrug intact.   REFLEXES: Negative clonus b/l  MOTOR: strength 5/5 b/l upper and lower extremities, RLE minimally limited 2/2 low back pain  SENSATION: grossly intact to light touch all extremities  COORDINATION:  no upper extremity dysmetria  SKIN: Warm, dry;   No stepoffs, No midline tenderness  + R paraspinal muscles tenderness    LABS:                        15.7   6.09  )-----------( 209      ( 22 Oct 2021 14:43 )             47.9     10-22    136  |  101  |  15  ----------------------------<  92  4.4   |  25  |  1.1    Ca    9.3      22 Oct 2021 14:43    TPro  6.6  /  Alb  4.3  /  TBili  0.8  /  DBili  x   /  AST  32  /  ALT  26  /  AlkPhos  56  10-22          CULTURES:      RADIOLOGY & ADDITIONAL STUDIES:    n/a

## 2021-10-22 NOTE — ED PROVIDER NOTE - PHYSICAL EXAMINATION
CONSTITUTIONAL: In moderate distress due to back pain.   HEAD: Normocephalic; atraumatic.   EYES: Pupils are round and reactive, extra-ocular muscles are intact.   ENT: External ears are non-tender and without swelling or erythema. Hearing is intact with good acuity to spoken voice. Patient is speaking clearly, not muffled and airway is intact.   NECK: Neck is supple without adenopathy. Trachea is midline.   RESPIRATORY: Chest wall is symmetric without deformity. No signs of respiratory distress. Lung sounds are clear in all lobes bilaterally without rales, rhonchi, or wheezes.  CARDIOVASCULAR: Regular rate and rhythm. Normal peripheral perfusion.   GI: Abdomen is soft, non-tender, and without distention. Bowel sounds are present and normoactive in all four quadrants. No masses are noted. No rebound, guarding, or rigidity.  Rectal: Normal rectal sphincter tone. No external masses or lesions. No bright red blood noticed.  BACK: No evidence of trauma or deformity. No midline tenderness. No CVA tenderness bilaterally. Normal ROM.   PELVIS: No evidence of trauma or deformity. No tenderness to palpation.  EXT: Decreased ROM on R lower extremity and tingling to palpation; sensory function intact and distal pulse present. R foot with good strength and no R foot drop noticed. Normal appearance in all four extremities.    SKIN: Skin is warm, dry and intact without apparent rashes or lesions.   NEURO: A & O x 4. Normal speech. Motor function is normal with good muscle strength to upper and lower extremities. Sensation is intact to all extremities.   PSYCHOLOGICAL: Appropriate mood and affect. Good judgement and insight. No visual or auditory hallucinations.

## 2021-10-22 NOTE — ED ADULT NURSE REASSESSMENT NOTE - NS ED NURSE REASSESS COMMENT FT1
Report given to [facility] LAYNE Toro at [phone] and she also answered for Crit Lead at ext. [phone]; pt AAOx4 and stable for transport. Awaiting transport.

## 2021-10-22 NOTE — ED PROVIDER NOTE - PROGRESS NOTE DETAILS
Although improved pt still with pain and having difficulty with movement.  Pt will need admisison for pain control.  Will transfer to [facility] for NSx eval.  Dr Orozco aware of transfer PA Fellow note reviewed and agree, patient will be sent to [facility] for neurosurgery eval for possible MRI Patient transferred from [facility] evaluated patient and recommended MR LS spine. will admit for intractable pain

## 2021-10-22 NOTE — ED PROVIDER NOTE - OBJECTIVE STATEMENT
40 y/o male with hx of sciatica and nephrectomy who presents with R back pain. Reports that he had nephrectomy 3 months ago 42 y/o male with hx of sciatica and nephrectomy who presents with R back pain. Reports that he had nephrectomy 3 months ago and has been sitting down more often than usual. Reports that 3 days after the surgery, started noticing worsening R back pain with radiation to his R leg. Reports that at the time, walking around can alleviate the pain. About 2 weeks ago, his pain got worse and cannot be alleviated even with walking. Reports that he was doing his daily stretching today and felt a "pop" on his R back. Since then, worsening pain on his R back with radiation to his R lower leg. Also feel like he has slight difficulty with urination. Denies saddle anesthesia and trouble with bowel movement. Denies fever, recent fall or injury, night sweat, weight loss, and hx of IV drug use. Reports that he went to ER a few days ago, had CT and duplex study which were normal. Also saw Dr. Sauer neurologist earlier this month and has MRI and EEG scheduled at the end of November.

## 2021-10-22 NOTE — ED PROVIDER NOTE - ATTENDING CONTRIBUTION TO CARE
42 yo M PMHx noted including rt partial nephrectomy presents  with rt sided low back pain that radiates to his leg.  Pt states that he has been having pain for a few weeks.   Was seen at [facility] and had CT scan and LE Duplex last and sent home with Tizanidine and Naprosyn.  Pt has aleo seen Dr Sauer of Neurology and scheduled for MRI for next month.  However, today pain became much worse after bending down to stretch.  He felt a pop in low back and having worsening pain and pain with movement.  States he called Dr Sauer who told sharmin to go to ED so he called 911. On exam pt in NAD AAO x 3, no midline vertebral tenderness, + tender rt sciatic notch, + SLR, no edema, no foot drop, sensation grossly intact, good rectal tone

## 2021-10-22 NOTE — ED PROVIDER NOTE - NS ED ROS FT
Constitutional: Negative for fever, chills, weight change, and fatigue.  HENT: Negative for headache, hearing change, ear pain, nasal congestion, and sore throat.  Eyes: Negative for eye pain, eye discharge, foreign body sensation, and vision change.  Cardiovascular: Negative for chest pain, palpitation, and orthopnea.  Respiratory: Negative for SOB, wheezing, cough and sputum production.  Gastrointestinal: Negative for nausea, vomiting, abdominal pain, constipation, diarrhea, hematochezia, and melena.  Genitourinary: Negative for flank pain, dysuria, urgency, frequency, hematuria, and urinary retention.  Neurological: Negative for dizziness, syncope, focal weakness, numbness, and loss of consciousness.  Musculoskeletal: + R back pain and R lower leg pain. Negative for joint swelling, arthralgias, neck pain.  Hematological: Negative for adenopathy. Does not bruise/bleed easily.  Psychiatric/Behavioral: Negative for anxiety/panic, depression, delusions, SI/HI, and memory changes.

## 2021-10-22 NOTE — CONSULT NOTE ADULT - ASSESSMENT
40 yo male with PMHx of chronic back pain and partial nephrectomy for renal ca p/w acute back pain exacerbation. [facility]     Discussed with Dr. Lima    MRI Lumbar spine  pain control prn  start Robaxin 750mg TID

## 2021-10-22 NOTE — ED ADULT NURSE NOTE - NSICDXPASTMEDICALHX_GEN_ALL_CORE_FT
PAST MEDICAL HISTORY:  Kidney mass     HANH (obstructive sleep apnea)     Renal cell cancer     Thyroid disease

## 2021-10-23 LAB
ALBUMIN SERPL ELPH-MCNC: 4.6 G/DL — SIGNIFICANT CHANGE UP (ref 3.5–5.2)
ALP SERPL-CCNC: 60 U/L — SIGNIFICANT CHANGE UP (ref 30–115)
ALT FLD-CCNC: 23 U/L — SIGNIFICANT CHANGE UP (ref 0–41)
ANION GAP SERPL CALC-SCNC: 13 MMOL/L — SIGNIFICANT CHANGE UP (ref 7–14)
AST SERPL-CCNC: 20 U/L — SIGNIFICANT CHANGE UP (ref 0–41)
BASOPHILS # BLD AUTO: 0.01 K/UL — SIGNIFICANT CHANGE UP (ref 0–0.2)
BASOPHILS NFR BLD AUTO: 0.1 % — SIGNIFICANT CHANGE UP (ref 0–1)
BILIRUB SERPL-MCNC: 0.7 MG/DL — SIGNIFICANT CHANGE UP (ref 0.2–1.2)
BUN SERPL-MCNC: 19 MG/DL — SIGNIFICANT CHANGE UP (ref 10–20)
CALCIUM SERPL-MCNC: 9.6 MG/DL — SIGNIFICANT CHANGE UP (ref 8.5–10.1)
CHLORIDE SERPL-SCNC: 101 MMOL/L — SIGNIFICANT CHANGE UP (ref 98–110)
CO2 SERPL-SCNC: 25 MMOL/L — SIGNIFICANT CHANGE UP (ref 17–32)
CREAT SERPL-MCNC: 1.1 MG/DL — SIGNIFICANT CHANGE UP (ref 0.7–1.5)
EOSINOPHIL # BLD AUTO: 0 K/UL — SIGNIFICANT CHANGE UP (ref 0–0.7)
EOSINOPHIL NFR BLD AUTO: 0 % — SIGNIFICANT CHANGE UP (ref 0–8)
GLUCOSE SERPL-MCNC: 132 MG/DL — HIGH (ref 70–99)
HCT VFR BLD CALC: 49.2 % — SIGNIFICANT CHANGE UP (ref 42–52)
HGB BLD-MCNC: 15.9 G/DL — SIGNIFICANT CHANGE UP (ref 14–18)
IMM GRANULOCYTES NFR BLD AUTO: 0.4 % — HIGH (ref 0.1–0.3)
LYMPHOCYTES # BLD AUTO: 1.51 K/UL — SIGNIFICANT CHANGE UP (ref 1.2–3.4)
LYMPHOCYTES # BLD AUTO: 12.5 % — LOW (ref 20.5–51.1)
MAGNESIUM SERPL-MCNC: 2 MG/DL — SIGNIFICANT CHANGE UP (ref 1.8–2.4)
MCHC RBC-ENTMCNC: 28 PG — SIGNIFICANT CHANGE UP (ref 27–31)
MCHC RBC-ENTMCNC: 32.3 G/DL — SIGNIFICANT CHANGE UP (ref 32–37)
MCV RBC AUTO: 86.6 FL — SIGNIFICANT CHANGE UP (ref 80–94)
MONOCYTES # BLD AUTO: 0.86 K/UL — HIGH (ref 0.1–0.6)
MONOCYTES NFR BLD AUTO: 7.1 % — SIGNIFICANT CHANGE UP (ref 1.7–9.3)
NEUTROPHILS # BLD AUTO: 9.64 K/UL — HIGH (ref 1.4–6.5)
NEUTROPHILS NFR BLD AUTO: 79.9 % — HIGH (ref 42.2–75.2)
NRBC # BLD: 0 /100 WBCS — SIGNIFICANT CHANGE UP (ref 0–0)
PHOSPHATE SERPL-MCNC: 2.6 MG/DL — SIGNIFICANT CHANGE UP (ref 2.1–4.9)
PLATELET # BLD AUTO: 241 K/UL — SIGNIFICANT CHANGE UP (ref 130–400)
POTASSIUM SERPL-MCNC: 4.8 MMOL/L — SIGNIFICANT CHANGE UP (ref 3.5–5)
POTASSIUM SERPL-SCNC: 4.8 MMOL/L — SIGNIFICANT CHANGE UP (ref 3.5–5)
PROT SERPL-MCNC: 6.8 G/DL — SIGNIFICANT CHANGE UP (ref 6–8)
RBC # BLD: 5.68 M/UL — SIGNIFICANT CHANGE UP (ref 4.7–6.1)
RBC # FLD: 14.5 % — SIGNIFICANT CHANGE UP (ref 11.5–14.5)
SODIUM SERPL-SCNC: 139 MMOL/L — SIGNIFICANT CHANGE UP (ref 135–146)
WBC # BLD: 12.07 K/UL — HIGH (ref 4.8–10.8)
WBC # FLD AUTO: 12.07 K/UL — HIGH (ref 4.8–10.8)

## 2021-10-23 PROCEDURE — 99223 1ST HOSP IP/OBS HIGH 75: CPT

## 2021-10-23 PROCEDURE — 72148 MRI LUMBAR SPINE W/O DYE: CPT | Mod: 26

## 2021-10-23 RX ORDER — INFLUENZA VIRUS VACCINE 15; 15; 15; 15 UG/.5ML; UG/.5ML; UG/.5ML; UG/.5ML
0.5 SUSPENSION INTRAMUSCULAR ONCE
Refills: 0 | Status: DISCONTINUED | OUTPATIENT
Start: 2021-10-23 | End: 2021-10-27

## 2021-10-23 RX ORDER — LIDOCAINE 4 G/100G
1 CREAM TOPICAL DAILY
Refills: 0 | Status: DISCONTINUED | OUTPATIENT
Start: 2021-10-23 | End: 2021-10-26

## 2021-10-23 RX ORDER — TIZANIDINE 4 MG/1
2 TABLET ORAL
Qty: 0 | Refills: 0 | DISCHARGE

## 2021-10-23 RX ORDER — SENNA PLUS 8.6 MG/1
2 TABLET ORAL AT BEDTIME
Refills: 0 | Status: DISCONTINUED | OUTPATIENT
Start: 2021-10-23 | End: 2021-10-26

## 2021-10-23 RX ORDER — LEVOTHYROXINE SODIUM 125 MCG
100 TABLET ORAL DAILY
Refills: 0 | Status: DISCONTINUED | OUTPATIENT
Start: 2021-10-23 | End: 2021-10-26

## 2021-10-23 RX ORDER — PANTOPRAZOLE SODIUM 20 MG/1
40 TABLET, DELAYED RELEASE ORAL
Refills: 0 | Status: DISCONTINUED | OUTPATIENT
Start: 2021-10-23 | End: 2021-10-26

## 2021-10-23 RX ORDER — POLYETHYLENE GLYCOL 3350 17 G/17G
17 POWDER, FOR SOLUTION ORAL DAILY
Refills: 0 | Status: DISCONTINUED | OUTPATIENT
Start: 2021-10-23 | End: 2021-10-26

## 2021-10-23 RX ORDER — ACETAMINOPHEN 500 MG
1000 TABLET ORAL EVERY 8 HOURS
Refills: 0 | Status: DISCONTINUED | OUTPATIENT
Start: 2021-10-23 | End: 2021-10-26

## 2021-10-23 RX ORDER — METHOCARBAMOL 500 MG/1
750 TABLET, FILM COATED ORAL THREE TIMES A DAY
Refills: 0 | Status: DISCONTINUED | OUTPATIENT
Start: 2021-10-23 | End: 2021-10-26

## 2021-10-23 RX ORDER — ENOXAPARIN SODIUM 100 MG/ML
40 INJECTION SUBCUTANEOUS DAILY
Refills: 0 | Status: DISCONTINUED | OUTPATIENT
Start: 2021-10-23 | End: 2021-10-26

## 2021-10-23 RX ORDER — OXYCODONE HYDROCHLORIDE 5 MG/1
10 TABLET ORAL EVERY 4 HOURS
Refills: 0 | Status: DISCONTINUED | OUTPATIENT
Start: 2021-10-23 | End: 2021-10-26

## 2021-10-23 RX ORDER — KETOROLAC TROMETHAMINE 30 MG/ML
15 SYRINGE (ML) INJECTION ONCE
Refills: 0 | Status: DISCONTINUED | OUTPATIENT
Start: 2021-10-23 | End: 2021-10-23

## 2021-10-23 RX ORDER — OXYCODONE HYDROCHLORIDE 5 MG/1
5 TABLET ORAL EVERY 4 HOURS
Refills: 0 | Status: DISCONTINUED | OUTPATIENT
Start: 2021-10-23 | End: 2021-10-25

## 2021-10-23 RX ADMIN — OXYCODONE HYDROCHLORIDE 10 MILLIGRAM(S): 5 TABLET ORAL at 22:13

## 2021-10-23 RX ADMIN — Medication 1000 MILLIGRAM(S): at 14:02

## 2021-10-23 RX ADMIN — Medication 1 MILLIGRAM(S): at 15:01

## 2021-10-23 RX ADMIN — LIDOCAINE 1 PATCH: 4 CREAM TOPICAL at 22:38

## 2021-10-23 RX ADMIN — Medication 1000 MILLIGRAM(S): at 23:12

## 2021-10-23 RX ADMIN — OXYCODONE HYDROCHLORIDE 10 MILLIGRAM(S): 5 TABLET ORAL at 11:36

## 2021-10-23 RX ADMIN — METHOCARBAMOL 750 MILLIGRAM(S): 500 TABLET, FILM COATED ORAL at 06:02

## 2021-10-23 RX ADMIN — LIDOCAINE 1 PATCH: 4 CREAM TOPICAL at 11:39

## 2021-10-23 RX ADMIN — MORPHINE SULFATE 4 MILLIGRAM(S): 50 CAPSULE, EXTENDED RELEASE ORAL at 02:58

## 2021-10-23 RX ADMIN — OXYCODONE HYDROCHLORIDE 5 MILLIGRAM(S): 5 TABLET ORAL at 19:00

## 2021-10-23 RX ADMIN — OXYCODONE HYDROCHLORIDE 10 MILLIGRAM(S): 5 TABLET ORAL at 12:06

## 2021-10-23 RX ADMIN — METHOCARBAMOL 750 MILLIGRAM(S): 500 TABLET, FILM COATED ORAL at 21:10

## 2021-10-23 RX ADMIN — METHOCARBAMOL 750 MILLIGRAM(S): 500 TABLET, FILM COATED ORAL at 13:07

## 2021-10-23 RX ADMIN — Medication 15 MILLIGRAM(S): at 06:50

## 2021-10-23 RX ADMIN — Medication 100 MICROGRAM(S): at 06:03

## 2021-10-23 RX ADMIN — Medication 1000 MILLIGRAM(S): at 22:35

## 2021-10-23 RX ADMIN — LIDOCAINE 1 PATCH: 4 CREAM TOPICAL at 19:09

## 2021-10-23 RX ADMIN — PANTOPRAZOLE SODIUM 40 MILLIGRAM(S): 20 TABLET, DELAYED RELEASE ORAL at 06:03

## 2021-10-23 RX ADMIN — Medication 15 MILLIGRAM(S): at 02:57

## 2021-10-23 RX ADMIN — ENOXAPARIN SODIUM 40 MILLIGRAM(S): 100 INJECTION SUBCUTANEOUS at 11:39

## 2021-10-23 RX ADMIN — OXYCODONE HYDROCHLORIDE 10 MILLIGRAM(S): 5 TABLET ORAL at 21:10

## 2021-10-23 RX ADMIN — OXYCODONE HYDROCHLORIDE 5 MILLIGRAM(S): 5 TABLET ORAL at 18:30

## 2021-10-23 NOTE — H&P ADULT - ATTENDING COMMENTS
40 YO M with a PMH of R sciatica, renal mass s/p nephrectomy, and hypothyroidism who was BIBEMS for eval of difficulty ambulating due to progressively worsening right-sided lower back pain for the past x 3 months, worse over the past x 3 days. Described as located in right lower lumbar region, radiating down hiw RLE, worse with movement, obesity, and constant. No trauma/falls. Denies any saddle anesthesia or bowel/bladder incontinence. No fevers/chills, rashes, ABD pain, CP, or SOB. ROS is negative except as above.    In the ED, X-Ray of lumbar spinne performed. Neurosurgery consulted and askes for MRI lumbar spine. Pt started on pain meds and IV steroids in the ED.     FMHx: Reviewed, not relevant    Physical exam shows obese pt in NAD. VSS, afebrile, not hypoxic on RA. A&Ox3. Neuro exam without deficits, motor/sensory intact, no dysarthria, no facial asymmetry. Muscle strength/sensation intact. CTA B/L with no W/C/R. RRR, no M/G/R. ABD is obese, soft and non-tender, normoactive BSs. LEs without swelling. No rashes. Back w/ no TTP over bony points, + Right paraspinal lumbar TTP, + SLR on the Right. Labs and radiology as above.    Acute on chronic back pain, suspect lumbar radiculopathy vs sciatica recurrence, non-traumatic; doubt cord compression. NSAIDs. Non-benzo muscle relaxers. Lidocaine patch. PRN pain meds. PT consult.  MRI. Fall precautions.  -Weight loss counseling    Renal mass s/p nephrectomy, obesity, and hypothyroidism. Restart home meds, except as stated above. DVT PPX. Inform PCP of pt's admission to hospital. My note supersedes the residents note.

## 2021-10-23 NOTE — H&P ADULT - NSHPLABSRESULTS_GEN_ALL_CORE
VITAL SIGNS: Last 24 Hours  T(C): 36.2 (22 Oct 2021 21:13), Max: 36.2 (22 Oct 2021 21:13)  T(F): 97.2 (22 Oct 2021 21:13), Max: 97.2 (22 Oct 2021 21:13)  HR: 98 (22 Oct 2021 21:13) (86 - 98)  BP: 147/83 (22 Oct 2021 21:13) (129/81 - 147/88)  BP(mean): --  RR: 18 (22 Oct 2021 21:13) (17 - 18)  SpO2: 97% (22 Oct 2021 21:13) (96% - 98%)    LABS:                        15.7   6.09  )-----------( 209      ( 22 Oct 2021 14:43 )             47.9     10-22    136  |  101  |  15  ----------------------------<  92  4.4   |  25  |  1.1    Ca    9.3      22 Oct 2021 14:43    TPro  6.6  /  Alb  4.3  /  TBili  0.8  /  DBili  x   /  AST  32  /  ALT  26  /  AlkPhos  56  10-22

## 2021-10-23 NOTE — H&P ADULT - NSHPPHYSICALEXAM_GEN_ALL_CORE
CONSTITUTIONAL: No acute distress, well-developed, well-groomed, AAOx3  HEAD: Atraumatic, normocephalic  EYES: EOM intact, PERRLA, conjunctiva and sclera clear  ENT: Supple, no masses, no thyromegaly, no bruits, no JVD; moist mucous membranes  PULMONARY: Clear to auscultation bilaterally; no wheezes, rales, or rhonchi  CARDIOVASCULAR: Regular rate and rhythm; no murmurs, rubs, or gallops  GASTROINTESTINAL: Soft, non-tender, non-distended; bowel sounds present  MUSCULOSKELETAL: 2+ peripheral pulses; no clubbing, no cyanosis, no edema  NEUROLOGY: REFLEXES: Negative clonus b/l  MOTOR: strength 5/5 b/l upper and lower extremities, RLE minimally limited 2/2 low back pain  SENSATION: grossly intact to light touch all extremities  COORDINATION:  no upper extremity dysmetria  No stepoffs, No midline tenderness  + R paraspinal muscles tenderness  SKIN: No rashes or lesions; warm and dry

## 2021-10-23 NOTE — H&P ADULT - HISTORY OF PRESENT ILLNESS
40 YO M with PMHx of R sciatica, renal mass s/p nephrectomy at [facility] in July 2021, hypothyroidism presented to the ED with right back pain.   He states that 3 days after the surgery he     started noticing worsening R back pain with radiation to his R leg. Reports that at the time, walking around can alleviate the pain. About 2 weeks ago, his pain got worse and cannot be alleviated even with walking. Reports that he was doing his daily stretching today and felt a "pop" on his R back. Since then, worsening pain on his R back with radiation to his R lower leg. Also feel like he has slight difficulty with urination. Denies saddle anesthesia and trouble with bowel movement. Denies fever, recent fall or injury, night sweat, weight loss, and hx of IV drug use. Reports that he went to ER a few days ago, had CT and duplex study which were normal. Also saw Dr. Sauer neurologist earlier this month and has MRI and EEG scheduled at the end of November. 40 YO M with PMHx of R sciatica, renal mass s/p nephrectomy at [facility] in July 2021, hypothyroidism presented to the ED with right back pain.   He states that 3 days after the surgery he started noticing worsening R back pain with radiation to his R leg. At that time walking around would alleviate the pain. Almost 2 weeks ago, the pain got worse. States that he was doing his daily stretching today and felt a "pop" on his R back. Since then, the pain has worsened.   Also felt like he has slight difficulty with urination. Denies saddle anesthesia and trouble with bowel movement. Denies fever, recent fall or injury, night sweat, weight loss, and history of IV drug use. Also admits to being on testosterone   Reports that he went to ER a few days ago, had CT and duplex study which were normal. Also saw Dr. Sauer neurologist earlier this month and has MRI and EEG scheduled at the end of November  In the ED VS were unremarkable. Received 6mg IV dexamethasone   Was evaluated by NS and admitted to medicine for further workup

## 2021-10-23 NOTE — H&P ADULT - ASSESSMENT
42 YO M with PMHx of R sciatica, renal mass s/p nephrectomy at [facility] in July 2021, hypothyroidism presented to the ED with right back pain    #Rule out cord compression; low suspicion  - MRI Lumbar spine as per the ED. Will need ativan before MRI  - x-ray lumbar spine negative   - will start Robaxin 750mg PO TID    #Hypothyroidism - Levothyroxine 100mcg daily     #Misc  - DVT Prophylaxis: Lovenox   - Diet: Regular   - GI Prophylaxis: Pantoprazole   - Activity: AAT  - Code Status: Full Code    Dispo: Admit to medicine

## 2021-10-23 NOTE — PATIENT PROFILE ADULT - DO YOU LACK THE NECESSARY SUPPORT TO HELP YOU COPE WITH LIFE CHALLENGES?
Colonoscopy once patient is agreeable.  Discussed with cardiology, pt ok to proceed with procedures. no

## 2021-10-24 PROCEDURE — 99221 1ST HOSP IP/OBS SF/LOW 40: CPT

## 2021-10-24 PROCEDURE — 70491 CT SOFT TISSUE NECK W/DYE: CPT | Mod: 26

## 2021-10-24 PROCEDURE — 99233 SBSQ HOSP IP/OBS HIGH 50: CPT

## 2021-10-24 RX ORDER — CEFTRIAXONE 500 MG/1
1000 INJECTION, POWDER, FOR SOLUTION INTRAMUSCULAR; INTRAVENOUS EVERY 24 HOURS
Refills: 0 | Status: DISCONTINUED | OUTPATIENT
Start: 2021-10-24 | End: 2021-10-25

## 2021-10-24 RX ORDER — DEXAMETHASONE 0.5 MG/5ML
6 ELIXIR ORAL EVERY 8 HOURS
Refills: 0 | Status: DISCONTINUED | OUTPATIENT
Start: 2021-10-24 | End: 2021-10-26

## 2021-10-24 RX ORDER — VANCOMYCIN HCL 1 G
1750 VIAL (EA) INTRAVENOUS EVERY 12 HOURS
Refills: 0 | Status: DISCONTINUED | OUTPATIENT
Start: 2021-10-24 | End: 2021-10-25

## 2021-10-24 RX ORDER — CHLORHEXIDINE GLUCONATE 213 G/1000ML
15 SOLUTION TOPICAL
Refills: 0 | Status: DISCONTINUED | OUTPATIENT
Start: 2021-10-24 | End: 2021-10-26

## 2021-10-24 RX ORDER — MORPHINE SULFATE 50 MG/1
4 CAPSULE, EXTENDED RELEASE ORAL EVERY 6 HOURS
Refills: 0 | Status: DISCONTINUED | OUTPATIENT
Start: 2021-10-24 | End: 2021-10-25

## 2021-10-24 RX ADMIN — Medication 6 MILLIGRAM(S): at 14:38

## 2021-10-24 RX ADMIN — MORPHINE SULFATE 4 MILLIGRAM(S): 50 CAPSULE, EXTENDED RELEASE ORAL at 09:30

## 2021-10-24 RX ADMIN — MORPHINE SULFATE 4 MILLIGRAM(S): 50 CAPSULE, EXTENDED RELEASE ORAL at 12:52

## 2021-10-24 RX ADMIN — LIDOCAINE 1 PATCH: 4 CREAM TOPICAL at 20:00

## 2021-10-24 RX ADMIN — CHLORHEXIDINE GLUCONATE 15 MILLILITER(S): 213 SOLUTION TOPICAL at 17:33

## 2021-10-24 RX ADMIN — Medication 6 MILLIGRAM(S): at 21:15

## 2021-10-24 RX ADMIN — CEFTRIAXONE 100 MILLIGRAM(S): 500 INJECTION, POWDER, FOR SOLUTION INTRAMUSCULAR; INTRAVENOUS at 14:39

## 2021-10-24 RX ADMIN — OXYCODONE HYDROCHLORIDE 10 MILLIGRAM(S): 5 TABLET ORAL at 22:15

## 2021-10-24 RX ADMIN — OXYCODONE HYDROCHLORIDE 10 MILLIGRAM(S): 5 TABLET ORAL at 15:00

## 2021-10-24 RX ADMIN — METHOCARBAMOL 750 MILLIGRAM(S): 500 TABLET, FILM COATED ORAL at 06:48

## 2021-10-24 RX ADMIN — Medication 1000 MILLIGRAM(S): at 21:14

## 2021-10-24 RX ADMIN — Medication 1000 MILLIGRAM(S): at 22:14

## 2021-10-24 RX ADMIN — SENNA PLUS 2 TABLET(S): 8.6 TABLET ORAL at 21:14

## 2021-10-24 RX ADMIN — MORPHINE SULFATE 4 MILLIGRAM(S): 50 CAPSULE, EXTENDED RELEASE ORAL at 13:15

## 2021-10-24 RX ADMIN — MORPHINE SULFATE 4 MILLIGRAM(S): 50 CAPSULE, EXTENDED RELEASE ORAL at 08:39

## 2021-10-24 RX ADMIN — OXYCODONE HYDROCHLORIDE 10 MILLIGRAM(S): 5 TABLET ORAL at 06:48

## 2021-10-24 RX ADMIN — MORPHINE SULFATE 4 MILLIGRAM(S): 50 CAPSULE, EXTENDED RELEASE ORAL at 23:51

## 2021-10-24 RX ADMIN — OXYCODONE HYDROCHLORIDE 10 MILLIGRAM(S): 5 TABLET ORAL at 21:15

## 2021-10-24 RX ADMIN — Medication 1000 MILLIGRAM(S): at 14:39

## 2021-10-24 RX ADMIN — METHOCARBAMOL 750 MILLIGRAM(S): 500 TABLET, FILM COATED ORAL at 14:41

## 2021-10-24 RX ADMIN — OXYCODONE HYDROCHLORIDE 10 MILLIGRAM(S): 5 TABLET ORAL at 14:46

## 2021-10-24 RX ADMIN — LIDOCAINE 1 PATCH: 4 CREAM TOPICAL at 11:06

## 2021-10-24 RX ADMIN — PANTOPRAZOLE SODIUM 40 MILLIGRAM(S): 20 TABLET, DELAYED RELEASE ORAL at 06:48

## 2021-10-24 RX ADMIN — Medication 100 MICROGRAM(S): at 06:48

## 2021-10-24 RX ADMIN — Medication 250 MILLIGRAM(S): at 17:32

## 2021-10-24 RX ADMIN — METHOCARBAMOL 750 MILLIGRAM(S): 500 TABLET, FILM COATED ORAL at 21:14

## 2021-10-24 NOTE — PROGRESS NOTE ADULT - ASSESSMENT
artifact. There is no ischemia noted. The estimated left ventricular function is 62%. The left ventricular size is mildly dilated. Cardiology okay with discharge. - ECHO showed normal LVEF 55-60%, no RWMAs, G1DD, normal SPAP. - Pt has loop recorder in place. Per EP interrogation was normal.   - Neurology consulted. Recommended outpt follow-up for his polyneuropathy. - Carotid ultrasounds are unremarkable. MRI brain showed no acute infarct.   - Reviewed care everywhere: TSH- WNL, B12 and folate- normal all recent no need to repeat. - Continue aspirin and statin (pt reports that he no longer is on statin). - PT/OT with recs for home PT/OT. - Continue meclizine as needed.      Hypertension, uncontrolled:  - Pt was taken off of ramipril since July. - Ramipril 5 mg daily restarted. Pt to follow-up with his PCP/cardiologist for further dose adjustments/addition of new medications.      Hyperlipidemia, controlled:  - Continue statin therapy.      BPH:   - Controlled with current regimen of Proscar and Flomax. Physical Exam Performed:     BP (!) 174/92   Pulse 75   Temp 98 °F (36.7 °C) (Oral)   Resp 16   Ht 6' 2\" (1.88 m)   Wt 198 lb (89.8 kg)   SpO2 97%   BMI 25.42 kg/m²       General appearance:  Elderly male in no apparent distress, appears stated age and cooperative. HEENT:  Normal cephalic, atraumatic without obvious deformity. Pupils equal, round, and reactive to light. Extra ocular muscles intact. Conjunctivae/corneas clear. Neck: Supple, with full range of motion. No jugular venous distention. Trachea midline. Respiratory:  Normal respiratory effort. Clear to auscultation, bilaterally without Rales/Wheezes/Rhonchi. Cardiovascular:  Regular rate and rhythm with normal S1/S2 without murmurs, rubs or gallops. Abdomen: Soft, non-tender, non-distended with normal bowel sounds. Musculoskeletal:  No clubbing, cyanosis or edema bilaterally. Full range of motion without deformity.   Skin: 40 YO M with PMHx of R sciatica, renal mass s/p nephrectomy at [facility] in July 2021, hypothyroidism presented to the ED with right back pain    #Moderate spinal stenosis with foraminal stenosis   - MRI Lumbar spine shows moderate spinal stenosis of L4-L5 with moderate foraminal stenosis   - x-ray lumbar spine negative   - continue Robaxin 750mg PO TID  - currently on oxycodone prn  - neurosurgery consult appreciated     #Uvulitis with hemoptysis   - ENT consult appreciated  - CT neck with con ordered  - dexa 6mg q8hr ordered  - ceftriaxone and vanco started 10/24  - ID consulted    #Hypothyroidism - Levothyroxine 100mcg daily     #Misc  - DVT Prophylaxis: Lovenox   - Diet: Regular   - GI Prophylaxis: Pantoprazole   - Activity: AAT  - Code Status: Full Code  - Pending: neurosurgery recs based on MRI, CT neck, ID consult

## 2021-10-24 NOTE — CONSULT NOTE ADULT - SUBJECTIVE AND OBJECTIVE BOX
ENT DAILY PROGRESS NOTE  HPI:  42 y/o male with hemoptysis x1 day and FB sensation in back of throat. States hes feeling hoarse and keep spitting up blood and having gagging feeling due to something stuck in the back of his throat. Denies any fevers, chills, N/V, SOB/difficulty breathing. Tolerating secretions, tolerating liquids and soft foods.     REVIEW OF SYSTEMS   [x] A ten-point review of systems was otherwise negative except as noted.    Allergies    penicillin (Unknown)    Intolerances    MEDICATIONS:  Antiinfectives:     IV fluids:    Hematologic/Anticoagulation:  enoxaparin Injectable 40 milliGRAM(s) SubCutaneous daily    Pain medications/Neuro:  acetaminophen     Tablet .. 1000 milliGRAM(s) Oral every 8 hours  methocarbamol 750 milliGRAM(s) Oral three times a day  morphine  - Injectable 4 milliGRAM(s) IV Push every 6 hours PRN  oxyCODONE    IR 5 milliGRAM(s) Oral every 4 hours PRN  oxyCODONE    IR 10 milliGRAM(s) Oral every 4 hours PRN    Endocrine Medications:   levothyroxine 100 MICROGram(s) Oral daily    All other standing medications:   influenza   Vaccine 0.5 milliLiter(s) IntraMuscular once  lidocaine   4% Patch 1 Patch Transdermal daily  pantoprazole    Tablet 40 milliGRAM(s) Oral before breakfast  polyethylene glycol 3350 17 Gram(s) Oral daily  senna 2 Tablet(s) Oral at bedtime    All other PRN medications:      Vital Signs Last 24 Hrs  T(C): 36.2 (24 Oct 2021 05:15), Max: 36.3 (23 Oct 2021 14:16)  T(F): 97.2 (24 Oct 2021 05:15), Max: 97.3 (23 Oct 2021 14:16)  HR: 73 (24 Oct 2021 05:15) (73 - 88)  BP: 115/59 (24 Oct 2021 05:15) (115/59 - 126/68)  BP(mean): --  RR: 18 (24 Oct 2021 05:15) (18 - 19)  SpO2: --      10-23 @ 07:01  -  10-24 @ 07:00  --------------------------------------------------------  IN:    Oral Fluid: 200 mL  Total IN: 200 mL    OUT:    Voided (mL): 750 mL  Total OUT: 750 mL    Total NET: -550 mL    PHYSICAL EXAM:  Gen: NAD, awake/alert. No drooling or pooling of secretions. No respiratory distress. No stridor or stertor.   Skin: Warm/pink. Non diaphoretic  Head: Normocephalic, Atraumatic  Face: No edema, erythema, or fluctuance. Parotid glands soft without mass  Eyes: EOMI, no scleral injection  Mouth: Oral mucosa moist/pink, uvula midline but very erythematous and edematous. + streaking of blood from uvula.  Neck: Flat, supple  Resp: Breathing easily, no wheezing  CV: extremities without edema  GI: Soft, nondistended   Musculoskeletal: moving all extremities x 4    Fiberoptic flexible laryngoscopy: - small amount of soft tissue edema near base of tongue and posterior oropharynx.. TVC/FVC mobile and intact. No laryngeal edema noted.    LABS:  CBC-                        15.9   12.07 )-----------( 241      ( 23 Oct 2021 04:30 )             49.2     BMP/CMP-  23 Oct 2021 04:30    139    |  101    |  19     ----------------------------<  132    4.8     |  25     |  1.1      Ca    9.6        23 Oct 2021 04:30  Phos  2.6       23 Oct 2021 04:30  Mg     2.0       23 Oct 2021 04:30    TPro  6.8    /  Alb  4.6    /  TBili  0.7    /  DBili  x      /  AST  20     /  ALT  23     /  AlkPhos  60     23 Oct 2021 04:30    Coagulation Studies-    Endocrine Panel-

## 2021-10-24 NOTE — CONSULT NOTE ADULT - ASSESSMENT
40 y/o male with uvulitis   - Peridex rinses  - Start IV abx  - Start decadron 6mg q8   - Will follow/will discuss with attending 40 y/o male with uvulitis   - Peridex rinses  - Start IV abx  - Start decadron 6mg q8   - CT soft tissue neck with con  - Will follow/will discuss with attending

## 2021-10-24 NOTE — PROGRESS NOTE ADULT - SUBJECTIVE AND OBJECTIVE BOX
Patient is a 41y old  Male who presents with a chief complaint of Back pain (24 Oct 2021 07:07)      Patient seen and examined at bedside.  Patient reports continued significant pain on right back radiating down right leg.  Reports developing sore throat over night and coughing up blood.  Denies any shortness of breath or chest pain.   ALLERGIES:  penicillin (Unknown)    MEDICATIONS:  acetaminophen     Tablet .. 1000 milliGRAM(s) Oral every 8 hours  cefTRIAXone   IVPB 1000 milliGRAM(s) IV Intermittent every 24 hours  chlorhexidine 0.12% Liquid 15 milliLiter(s) Oral Mucosa two times a day  dexAMETHasone  Injectable 6 milliGRAM(s) IV Push every 8 hours  enoxaparin Injectable 40 milliGRAM(s) SubCutaneous daily  influenza   Vaccine 0.5 milliLiter(s) IntraMuscular once  levothyroxine 100 MICROGram(s) Oral daily  lidocaine   4% Patch 1 Patch Transdermal daily  methocarbamol 750 milliGRAM(s) Oral three times a day  morphine  - Injectable 4 milliGRAM(s) IV Push every 6 hours PRN  oxyCODONE    IR 5 milliGRAM(s) Oral every 4 hours PRN  oxyCODONE    IR 10 milliGRAM(s) Oral every 4 hours PRN  pantoprazole    Tablet 40 milliGRAM(s) Oral before breakfast  polyethylene glycol 3350 17 Gram(s) Oral daily  senna 2 Tablet(s) Oral at bedtime  vancomycin  IVPB 1750 milliGRAM(s) IV Intermittent every 12 hours    Vital Signs Last 24 Hrs  T(F): 96.8 (24 Oct 2021 13:17), Max: 97.2 (24 Oct 2021 05:15)  HR: 94 (24 Oct 2021 13:17) (73 - 94)  BP: 109/70 (24 Oct 2021 13:17) (109/70 - 125/63)  RR: 18 (24 Oct 2021 13:17) (18 - 18)  SpO2: --  I&O's Summary    23 Oct 2021 07:01  -  24 Oct 2021 07:00  --------------------------------------------------------  IN: 200 mL / OUT: 750 mL / NET: -550 mL        PHYSICAL EXAM:  General: NAD, A/O x 3  ENT: MMM, limited exam due to no instrument used to depress tongue, uvula was not visualized no significant erythema or edema noted   Neck: Supple, No JVD  Lungs: Clear to auscultation bilaterally  Cardio: RRR, S1/S2, No murmurs  Abdomen: Soft, Nontender, Nondistended; Bowel sounds present  Extremities: No cyanosis, No edema    LABS:                        15.9   12.07 )-----------( 241      ( 23 Oct 2021 04:30 )             49.2     10-23    139  |  101  |  19  ----------------------------<  132  4.8   |  25  |  1.1    Ca    9.6      23 Oct 2021 04:30  Phos  2.6     10-23  Mg     2.0     10-23    TPro  6.8  /  Alb  4.6  /  TBili  0.7  /  DBili  x   /  AST  20  /  ALT  23  /  AlkPhos  60  10-23    eGFR if Non African American: 83 mL/min/1.73M2 (10-23-21 @ 04:30)  eGFR if African American: 96 mL/min/1.73M2 (10-23-21 @ 04:30)                                  COVID-19 PCR: NotDetec (10-22-21 @ 15:00)      RADIOLOGY & ADDITIONAL TESTS:  < from: MR Lumbar Spine No Cont (10.23.21 @ 15:38) >  IMPRESSION:    *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 There are 4 rib-free lumbar type vertebrae using this counting method.    1.3 cm right paracentral disc extrusion at L4-5 effacing the right lateral recess with posterior displacement of the descending L5 nerve roots. This results in moderate spinal stenosis and moderate right worse than left foraminal stenosis    < end of copied text >    Care Discussed with Consultants/Other Providers:

## 2021-10-24 NOTE — PROGRESS NOTE ADULT - ASSESSMENT
42 y/o male with uvulitis, feeling better than this morning however still feels hoarse and a foreign body sensation in his throat though less than when admitted. Tolerating secretions, tolerating liquids and soft foods.     - Continue peridex rinses  - Continue IV abx  - Continue steroids   - Recommend CT soft tissue neck with con  - Will follow/will discuss with attending

## 2021-10-24 NOTE — PROGRESS NOTE ADULT - SUBJECTIVE AND OBJECTIVE BOX
ENT Progress Note: 40 y/o male with hemoptysis x1 day and FB sensation in back of throat. Patient seen and examined at bedside this afternoon, states he is feeling better than this morning however still feels hoarse and a foreign body sensation in his throat though less than when admitted. Denies any fevers, chills, N/V, SOB/difficulty breathing. Tolerating secretions, tolerating liquids and soft foods.     [ x ] A 10 Point Review of Systems was negative except where noted.    Allergies: penicillin (Unknown)    MEDICATIONS  (STANDING):  acetaminophen     Tablet .. 1000 milliGRAM(s) Oral every 8 hours  cefTRIAXone   IVPB 1000 milliGRAM(s) IV Intermittent every 24 hours  chlorhexidine 0.12% Liquid 15 milliLiter(s) Oral Mucosa two times a day  dexAMETHasone  Injectable 6 milliGRAM(s) IV Push every 8 hours  enoxaparin Injectable 40 milliGRAM(s) SubCutaneous daily  influenza   Vaccine 0.5 milliLiter(s) IntraMuscular once  levothyroxine 100 MICROGram(s) Oral daily  lidocaine   4% Patch 1 Patch Transdermal daily  methocarbamol 750 milliGRAM(s) Oral three times a day  pantoprazole    Tablet 40 milliGRAM(s) Oral before breakfast  polyethylene glycol 3350 17 Gram(s) Oral daily  senna 2 Tablet(s) Oral at bedtime  vancomycin  IVPB 1750 milliGRAM(s) IV Intermittent every 12 hours    MEDICATIONS  (PRN):  morphine  - Injectable 4 milliGRAM(s) IV Push every 6 hours PRN Severe Pain (7 - 10)  oxyCODONE    IR 5 milliGRAM(s) Oral every 4 hours PRN Moderate Pain (4 - 6)  oxyCODONE    IR 10 milliGRAM(s) Oral every 4 hours PRN Severe Pain (7 - 10)    Vital Signs Last 24 Hrs  T(C): 36.5 (24 Oct 2021 19:59), Max: 36.5 (24 Oct 2021 19:59)  T(F): 97.7 (24 Oct 2021 19:59), Max: 97.7 (24 Oct 2021 19:59)  HR: 84 (24 Oct 2021 19:59) (73 - 94)  BP: 133/77 (24 Oct 2021 19:59) (109/70 - 133/77)  RR: 18 (24 Oct 2021 19:59) (18 - 18)    PHYSICAL EXAM:  Gen: Well-developed, well-nourished, NAD.  No drooling or pooling of secretions.  Good vocal quality, no hoarseness  Skin: Good color, non diaphoretic  HEENT: Head NC/AT. Nares bilaterally patent, no blood or discharge noted. Oral cavity no erythema/edema. Tongue wnl, uvula midline, erythematous and edematous. + streaking of blood from uvula., no tenderness throughout FOM. Posterior oropharynx clear, no blood/discharge noted.   Neck: Trachea midline, supple  Resp: Breathing easily, no accessory muscle use, no stridor or stertor.    Cardio: +S1/S2  Abd: Soft, nontender, nondistended  Neuro: Awake and alert  Psych: Normal mood, normal affect  Ext: No peripheral edema/cyanosis, CARMEN x 4    LABS:                      15.9   12.07 )-----------( 241      ( 23 Oct 2021 04:30 )             49.2     10-23    139  |  101  |  19  ----------------------------<  132<H>  4.8   |  25  |  1.1    Ca    9.6      23 Oct 2021 04:30  Phos  2.6     10-23  Mg     2.0     10-23    TPro  6.8  /  Alb  4.6  /  TBili  0.7  /  DBili  x   /  AST  20  /  ALT  23  /  AlkPhos  60  10-23

## 2021-10-25 LAB
ANION GAP SERPL CALC-SCNC: 17 MMOL/L — HIGH (ref 7–14)
BASOPHILS # BLD AUTO: 0.01 K/UL — SIGNIFICANT CHANGE UP (ref 0–0.2)
BASOPHILS NFR BLD AUTO: 0.1 % — SIGNIFICANT CHANGE UP (ref 0–1)
BLD GP AB SCN SERPL QL: SIGNIFICANT CHANGE UP
BUN SERPL-MCNC: 17 MG/DL — SIGNIFICANT CHANGE UP (ref 10–20)
CALCIUM SERPL-MCNC: 9.5 MG/DL — SIGNIFICANT CHANGE UP (ref 8.5–10.1)
CHLORIDE SERPL-SCNC: 99 MMOL/L — SIGNIFICANT CHANGE UP (ref 98–110)
CO2 SERPL-SCNC: 20 MMOL/L — SIGNIFICANT CHANGE UP (ref 17–32)
COVID-19 SPIKE DOMAIN AB INTERP: POSITIVE
COVID-19 SPIKE DOMAIN ANTIBODY RESULT: >250 U/ML — HIGH
CREAT SERPL-MCNC: 1 MG/DL — SIGNIFICANT CHANGE UP (ref 0.7–1.5)
EOSINOPHIL # BLD AUTO: 0 K/UL — SIGNIFICANT CHANGE UP (ref 0–0.7)
EOSINOPHIL NFR BLD AUTO: 0 % — SIGNIFICANT CHANGE UP (ref 0–8)
GLUCOSE SERPL-MCNC: 126 MG/DL — HIGH (ref 70–99)
HCT VFR BLD CALC: 47.1 % — SIGNIFICANT CHANGE UP (ref 42–52)
HGB BLD-MCNC: 15.6 G/DL — SIGNIFICANT CHANGE UP (ref 14–18)
IMM GRANULOCYTES NFR BLD AUTO: 0.3 % — SIGNIFICANT CHANGE UP (ref 0.1–0.3)
LYMPHOCYTES # BLD AUTO: 0.99 K/UL — LOW (ref 1.2–3.4)
LYMPHOCYTES # BLD AUTO: 10.4 % — LOW (ref 20.5–51.1)
MCHC RBC-ENTMCNC: 28.7 PG — SIGNIFICANT CHANGE UP (ref 27–31)
MCHC RBC-ENTMCNC: 33.1 G/DL — SIGNIFICANT CHANGE UP (ref 32–37)
MCV RBC AUTO: 86.6 FL — SIGNIFICANT CHANGE UP (ref 80–94)
MONOCYTES # BLD AUTO: 0.22 K/UL — SIGNIFICANT CHANGE UP (ref 0.1–0.6)
MONOCYTES NFR BLD AUTO: 2.3 % — SIGNIFICANT CHANGE UP (ref 1.7–9.3)
NEUTROPHILS # BLD AUTO: 8.31 K/UL — HIGH (ref 1.4–6.5)
NEUTROPHILS NFR BLD AUTO: 86.9 % — HIGH (ref 42.2–75.2)
NRBC # BLD: 0 /100 WBCS — SIGNIFICANT CHANGE UP (ref 0–0)
PLATELET # BLD AUTO: 245 K/UL — SIGNIFICANT CHANGE UP (ref 130–400)
POTASSIUM SERPL-MCNC: 4.5 MMOL/L — SIGNIFICANT CHANGE UP (ref 3.5–5)
POTASSIUM SERPL-SCNC: 4.5 MMOL/L — SIGNIFICANT CHANGE UP (ref 3.5–5)
RBC # BLD: 5.44 M/UL — SIGNIFICANT CHANGE UP (ref 4.7–6.1)
RBC # FLD: 14.4 % — SIGNIFICANT CHANGE UP (ref 11.5–14.5)
SARS-COV-2 IGG+IGM SERPL QL IA: >250 U/ML — HIGH
SARS-COV-2 IGG+IGM SERPL QL IA: POSITIVE
SODIUM SERPL-SCNC: 136 MMOL/L — SIGNIFICANT CHANGE UP (ref 135–146)
WBC # BLD: 9.56 K/UL — SIGNIFICANT CHANGE UP (ref 4.8–10.8)
WBC # FLD AUTO: 9.56 K/UL — SIGNIFICANT CHANGE UP (ref 4.8–10.8)

## 2021-10-25 PROCEDURE — 93010 ELECTROCARDIOGRAM REPORT: CPT

## 2021-10-25 PROCEDURE — 71046 X-RAY EXAM CHEST 2 VIEWS: CPT | Mod: 26

## 2021-10-25 PROCEDURE — 99232 SBSQ HOSP IP/OBS MODERATE 35: CPT

## 2021-10-25 PROCEDURE — 99223 1ST HOSP IP/OBS HIGH 75: CPT

## 2021-10-25 RX ORDER — OXYCODONE HYDROCHLORIDE 5 MG/1
10 TABLET ORAL EVERY 12 HOURS
Refills: 0 | Status: DISCONTINUED | OUTPATIENT
Start: 2021-10-25 | End: 2021-10-26

## 2021-10-25 RX ORDER — CEFTRIAXONE 500 MG/1
2000 INJECTION, POWDER, FOR SOLUTION INTRAMUSCULAR; INTRAVENOUS EVERY 24 HOURS
Refills: 0 | Status: DISCONTINUED | OUTPATIENT
Start: 2021-10-25 | End: 2021-10-26

## 2021-10-25 RX ORDER — OXYCODONE HYDROCHLORIDE 5 MG/1
10 TABLET ORAL EVERY 6 HOURS
Refills: 0 | Status: DISCONTINUED | OUTPATIENT
Start: 2021-10-25 | End: 2021-10-26

## 2021-10-25 RX ADMIN — MORPHINE SULFATE 4 MILLIGRAM(S): 50 CAPSULE, EXTENDED RELEASE ORAL at 00:51

## 2021-10-25 RX ADMIN — CEFTRIAXONE 100 MILLIGRAM(S): 500 INJECTION, POWDER, FOR SOLUTION INTRAMUSCULAR; INTRAVENOUS at 13:01

## 2021-10-25 RX ADMIN — Medication 6 MILLIGRAM(S): at 13:01

## 2021-10-25 RX ADMIN — METHOCARBAMOL 750 MILLIGRAM(S): 500 TABLET, FILM COATED ORAL at 13:01

## 2021-10-25 RX ADMIN — MORPHINE SULFATE 4 MILLIGRAM(S): 50 CAPSULE, EXTENDED RELEASE ORAL at 06:35

## 2021-10-25 RX ADMIN — Medication 1000 MILLIGRAM(S): at 13:01

## 2021-10-25 RX ADMIN — CEFTRIAXONE 100 MILLIGRAM(S): 500 INJECTION, POWDER, FOR SOLUTION INTRAMUSCULAR; INTRAVENOUS at 21:35

## 2021-10-25 RX ADMIN — Medication 1000 MILLIGRAM(S): at 15:30

## 2021-10-25 RX ADMIN — Medication 6 MILLIGRAM(S): at 21:36

## 2021-10-25 RX ADMIN — Medication 250 MILLIGRAM(S): at 06:27

## 2021-10-25 RX ADMIN — CHLORHEXIDINE GLUCONATE 15 MILLILITER(S): 213 SOLUTION TOPICAL at 17:21

## 2021-10-25 RX ADMIN — CHLORHEXIDINE GLUCONATE 15 MILLILITER(S): 213 SOLUTION TOPICAL at 06:28

## 2021-10-25 RX ADMIN — SENNA PLUS 2 TABLET(S): 8.6 TABLET ORAL at 21:37

## 2021-10-25 RX ADMIN — OXYCODONE HYDROCHLORIDE 10 MILLIGRAM(S): 5 TABLET ORAL at 21:34

## 2021-10-25 RX ADMIN — OXYCODONE HYDROCHLORIDE 10 MILLIGRAM(S): 5 TABLET ORAL at 11:07

## 2021-10-25 RX ADMIN — PANTOPRAZOLE SODIUM 40 MILLIGRAM(S): 20 TABLET, DELAYED RELEASE ORAL at 06:27

## 2021-10-25 RX ADMIN — OXYCODONE HYDROCHLORIDE 10 MILLIGRAM(S): 5 TABLET ORAL at 10:07

## 2021-10-25 RX ADMIN — MORPHINE SULFATE 4 MILLIGRAM(S): 50 CAPSULE, EXTENDED RELEASE ORAL at 15:30

## 2021-10-25 RX ADMIN — Medication 1000 MILLIGRAM(S): at 21:36

## 2021-10-25 RX ADMIN — OXYCODONE HYDROCHLORIDE 10 MILLIGRAM(S): 5 TABLET ORAL at 18:20

## 2021-10-25 RX ADMIN — ENOXAPARIN SODIUM 40 MILLIGRAM(S): 100 INJECTION SUBCUTANEOUS at 11:37

## 2021-10-25 RX ADMIN — OXYCODONE HYDROCHLORIDE 10 MILLIGRAM(S): 5 TABLET ORAL at 17:21

## 2021-10-25 RX ADMIN — Medication 100 MICROGRAM(S): at 06:27

## 2021-10-25 RX ADMIN — MORPHINE SULFATE 4 MILLIGRAM(S): 50 CAPSULE, EXTENDED RELEASE ORAL at 13:07

## 2021-10-25 RX ADMIN — METHOCARBAMOL 750 MILLIGRAM(S): 500 TABLET, FILM COATED ORAL at 06:27

## 2021-10-25 RX ADMIN — Medication 1000 MILLIGRAM(S): at 06:26

## 2021-10-25 RX ADMIN — METHOCARBAMOL 750 MILLIGRAM(S): 500 TABLET, FILM COATED ORAL at 21:35

## 2021-10-25 RX ADMIN — Medication 6 MILLIGRAM(S): at 06:27

## 2021-10-25 RX ADMIN — LIDOCAINE 1 PATCH: 4 CREAM TOPICAL at 11:37

## 2021-10-25 NOTE — PROGRESS NOTE ADULT - SUBJECTIVE AND OBJECTIVE BOX
JACQUELYN CUELLAR  41y  Male          INTERVAL HPI/OVERNIGHT EVENTS:    Pt, is on hospital day 3, reports continued back pain, with short relief only following pain meds. Able to ambulate with slight limp. Denies any N/V/D or fevers. Is continuing to complain about throat pain and mild difficulty swallowing.     REVIEW OF SYSTEMS:  CONSTITUTIONAL: No fever, weight loss, or fatigue  ENMT: (+) throat pain; No difficulty hearing, tinnitus, vertigo  RESPIRATORY: Hemoptysis yesterday; No cough, wheezing, chills; No shortness of breath  CARDIOVASCULAR: No chest pain, palpitations, dizziness, or leg swelling  GASTROINTESTINAL: No abdominal or epigastric pain. No nausea, vomiting, or hematemesis; No diarrhea or constipation. No melena or hematochezia.  GENITOURINARY: No dysuria, frequency, hematuria, or incontinence  NEUROLOGICAL: No headaches, memory loss, loss of strength, numbness, or tremors  MUSCULOSKELETAL: No joint pain or swelling; No muscle, back, or extremity pain      Vitals:  T(C): 36 (10-25-21 @ 04:49), Max: 36.5 (10-24-21 @ 19:59)  HR: 66 (10-25-21 @ 04:49) (66 - 94)  BP: 103/54 (10-25-21 @ 04:49) (103/54 - 133/77)  RR: 18 (10-25-21 @ 04:49) (18 - 18)  SpO2: --  Wt(kg): --Vital Signs Last 24 Hrs  T(C): 36 (25 Oct 2021 04:49), Max: 36.5 (24 Oct 2021 19:59)  T(F): 96.8 (25 Oct 2021 04:49), Max: 97.7 (24 Oct 2021 19:59)  HR: 66 (25 Oct 2021 04:49) (66 - 94)  BP: 103/54 (25 Oct 2021 04:49) (103/54 - 133/77)  BP(mean): --  RR: 18 (25 Oct 2021 04:49) (18 - 18)  SpO2: --  penicillin (Unknown)      PHYSICAL EXAM:  GENERAL: NAD, well-groomed, well-developed  HEAD:  Atraumatic, Normocephalic  ENMT: No tonsillar erythema, exudates, or enlargement; Moist mucous membranes, Good dentition, No lesions; unable to visualize uvula w/o tongue depressor.   NERVOUS SYSTEM:  Alert & Oriented X3, Good concentration;  CHEST/LUNG: Clear to percussion bilaterally; No rales, rhonchi, wheezing, or rubs  HEART: Regular rate and rhythm; No murmurs, rubs, or gallops  ABDOMEN: Soft, Nontender, Nondistended; Bowel sounds present      Consultant(s) Notes Reviewed:  [x ] YES  [ ] NO  Care Discussed with Consultants/Other Providers [ x] YES  [ ] NO    LABS:                        15.6   9.56  )-----------( 245      ( 25 Oct 2021 04:30 )             47.1       10-25    136  |  99  |  17  ----------------------------<  126<H>  4.5   |  20  |  1.0    Ca    9.5      25 Oct 2021 04:30             RADIOLOGY & ADDITIONAL TESTS:    CT Neck Soft Tissue w/ IV Cont (10.24.21 @ 19:11)  IMPRESSION:  Generalized thickening with apparent laceration of the inferior aspect of the uvula possibly representing uvulitis given clinical history. No abnormal fluid collection or lymphadenopathy.      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 There are 4 rib-free lumbar type vertebrae using this counting method.    1.3 cm right paracentral disc extrusion at L4-5 effacing the right lateral recess with posterior displacement of the descending L5 nerve roots. This results in moderate spinal stenosis and moderate right worse than left foraminal stenosis        Xray Lumbar Spine AP + Lateral (10.22.21 @ 18:18) >  FINDINGS: There is a transitional vertebra with sacralization of L5.  There is normal-appearing alignment of the vertebral bodies.  Vertebral body heights appear normal.  No acute fracture or dislocation is seen.  No definite disc space narrowing is seen.  The remainder of visualized bones are grossly unremarkable.  There is a non-obstructive bowel gas pattern.  No abnormal masses or calcifications are seen.    IMPRESSION: No acute fracture or dislocation is seen.        acetaminophen     Tablet .. 1000 milliGRAM(s) Oral every 8 hours  cefTRIAXone   IVPB 1000 milliGRAM(s) IV Intermittent every 24 hours  chlorhexidine 0.12% Liquid 15 milliLiter(s) Oral Mucosa two times a day  dexAMETHasone  Injectable 6 milliGRAM(s) IV Push every 8 hours  enoxaparin Injectable 40 milliGRAM(s) SubCutaneous daily  influenza   Vaccine 0.5 milliLiter(s) IntraMuscular once  levothyroxine 100 MICROGram(s) Oral daily  lidocaine   4% Patch 1 Patch Transdermal daily  methocarbamol 750 milliGRAM(s) Oral three times a day  morphine  - Injectable 4 milliGRAM(s) IV Push every 6 hours PRN  oxyCODONE    IR 5 milliGRAM(s) Oral every 4 hours PRN  oxyCODONE    IR 10 milliGRAM(s) Oral every 4 hours PRN  pantoprazole    Tablet 40 milliGRAM(s) Oral before breakfast  polyethylene glycol 3350 17 Gram(s) Oral daily  senna 2 Tablet(s) Oral at bedtime  vancomycin  IVPB 1750 milliGRAM(s) IV Intermittent every 12 hours

## 2021-10-25 NOTE — PROGRESS NOTE ADULT - ASSESSMENT
Pt is a 42yo M w/ PMH of R sciatica, and renal mass s/p partial nephrectomy at [facility] 8/21, and hypothyroidism presented to the ED w/ R. back pain since his nephrectomy, but worsened recently (~2wk ago). During hospital admission, pt has begun to complain of throat pain, mild difficulty swallowing and an episode of hemoptysis. Pt's MRI showed 1.3 cm right paracentral disc extrusion at L4-5 effacing the right lateral recess with posterior displacement of the descending L5 nerve roots, resulting in moderate spinal stenosis and moderate right worse than left foraminal stenosis. CT neck also demonstrated Generalized thickening with apparent laceration of the inferior aspect of the uvula possibly representing uvulitis.     #Mod-spinal stenosis w/ foraminal stenosis  -MRI lumbar demonstrated moderate spinal stenosis of L4-L5 w/ mod foraminal stenosis; Lumbar XR neg  -Currently on Robaxin 750mg po TID  -Oxycodone and Morphine PRN  -neurosurg consult appreciated; plan is for L4-L5 Microlumbar diskectomy   -NPO MN    #Uvulitis with hemoptysis  -ENT c/s appreciated;   - CT neck demonstrated possible uveitis; no fluid collection or LAD  -Dexamethasone 6mg IVP q8  -ceftriaxone and vancomycin started 10/24; waiting on ID reccs, per ENT can possibly switch to Unasyn or clidna.  -c/s ID  -anesthesia will see pt, scheduled with neurosx for microlumbar diskectomy tomorrow.     #Hypothyroidism  -Levothyroxine 100mcg qd    #Misc  DVT PPx:  Diet - Regular, NPO MN  GI PPx; Pantoprazole

## 2021-10-25 NOTE — PROGRESS NOTE ADULT - SUBJECTIVE AND OBJECTIVE BOX
Patient is a 41y old  Male who presents with a chief complaint of Back pain (25 Oct 2021 11:23)      Patient seen and examined at bedside.    ALLERGIES:  penicillin (Unknown)    MEDICATIONS:  acetaminophen     Tablet .. 1000 milliGRAM(s) Oral every 8 hours  cefTRIAXone   IVPB 2000 milliGRAM(s) IV Intermittent every 24 hours  chlorhexidine 0.12% Liquid 15 milliLiter(s) Oral Mucosa two times a day  dexAMETHasone  Injectable 6 milliGRAM(s) IV Push every 8 hours  enoxaparin Injectable 40 milliGRAM(s) SubCutaneous daily  influenza   Vaccine 0.5 milliLiter(s) IntraMuscular once  levothyroxine 100 MICROGram(s) Oral daily  lidocaine   4% Patch 1 Patch Transdermal daily  methocarbamol 750 milliGRAM(s) Oral three times a day  oxyCODONE    IR 10 milliGRAM(s) Oral every 4 hours PRN  oxyCODONE    IR 10 milliGRAM(s) Oral every 6 hours PRN  oxyCODONE  ER Tablet 10 milliGRAM(s) Oral every 12 hours  pantoprazole    Tablet 40 milliGRAM(s) Oral before breakfast  polyethylene glycol 3350 17 Gram(s) Oral daily  senna 2 Tablet(s) Oral at bedtime    Vital Signs Last 24 Hrs  T(F): 96.6 (25 Oct 2021 12:06), Max: 97.7 (24 Oct 2021 19:59)  HR: 79 (25 Oct 2021 12:06) (66 - 84)  BP: 143/61 (25 Oct 2021 12:06) (103/54 - 143/61)  RR: 18 (25 Oct 2021 12:06) (18 - 18)  SpO2: --  I&O's Summary      PHYSICAL EXAM:  General: NAD, A/O x 3  ENT: MMM  Neck: Supple, No JVD  Lungs: Clear to auscultation bilaterally  Cardio: RRR, S1/S2, No murmurs  Abdomen: Soft, Nontender, Nondistended; Bowel sounds present  Extremities: No cyanosis, No edema    LABS:                        15.6   9.56  )-----------( 245      ( 25 Oct 2021 04:30 )             47.1     10-25    136  |  99  |  17  ----------------------------<  126  4.5   |  20  |  1.0    Ca    9.5      25 Oct 2021 04:30  Phos  2.6     10-23  Mg     2.0     10-23    TPro  6.8  /  Alb  4.6  /  TBili  0.7  /  DBili  x   /  AST  20  /  ALT  23  /  AlkPhos  60  10-23    eGFR if Non : 93 mL/min/1.73M2 (10-25-21 @ 04:30)  eGFR if African American: 108 mL/min/1.73M2 (10-25-21 @ 04:30)                                  COVID-19 PCR: NotDetec (10-22-21 @ 15:00)      RADIOLOGY & ADDITIONAL TESTS:  < from: Xray Chest 2 Views PA/Lat (10.25.21 @ 11:39) >  Impression:    No radiographic evidence of acute cardiopulmonary disease.    < end of copied text >  < from: CT Neck Soft Tissue w/ IV Cont (10.24.21 @ 19:11) >    IMPRESSION:  Generalized thickening with apparent laceration of the inferior aspect of the uvula possibly representing uvulitis given clinical history. No abnormal fluid collection or lymphadenopathy.    < end of copied text >    Care Discussed with Consultants/Other Providers:

## 2021-10-25 NOTE — PROGRESS NOTE ADULT - SUBJECTIVE AND OBJECTIVE BOX
Surgery:                   L4-5 Microlumbar diskectomy     HPI  Procedure:  INTRACTABLE BACK PAIN    ^CALF PAIN    Handoff    MEWS Score    HANH (obstructive sleep apnea)    Thyroid disease    Kidney mass    Renal cell cancer    Back pain    Intractable back pain    H/O bariatric surgery    CALF PAIN    90+    [email or web address]      acetaminophen     Tablet .. 1000 milliGRAM(s) Oral every 8 hours  cefTRIAXone   IVPB 1000 milliGRAM(s) IV Intermittent every 24 hours  chlorhexidine 0.12% Liquid 15 milliLiter(s) Oral Mucosa two times a day  dexAMETHasone  Injectable 6 milliGRAM(s) IV Push every 8 hours  enoxaparin Injectable 40 milliGRAM(s) SubCutaneous daily  influenza   Vaccine 0.5 milliLiter(s) IntraMuscular once  levothyroxine 100 MICROGram(s) Oral daily  lidocaine   4% Patch 1 Patch Transdermal daily  methocarbamol 750 milliGRAM(s) Oral three times a day  morphine  - Injectable 4 milliGRAM(s) IV Push every 6 hours PRN  oxyCODONE    IR 5 milliGRAM(s) Oral every 4 hours PRN  oxyCODONE    IR 10 milliGRAM(s) Oral every 4 hours PRN  pantoprazole    Tablet 40 milliGRAM(s) Oral before breakfast  polyethylene glycol 3350 17 Gram(s) Oral daily  senna 2 Tablet(s) Oral at bedtime  vancomycin  IVPB 1750 milliGRAM(s) IV Intermittent every 12 hours    penicillin (Unknown)      10-25    136  |  99  |  17  ----------------------------<  126<H>  4.5   |  20  |  1.0    Ca    9.5      25 Oct 2021 04:30      CBC Full  -  ( 25 Oct 2021 04:30 )  WBC Count : 9.56 K/uL  RBC Count : 5.44 M/uL  Hemoglobin : 15.6 g/dL  Hematocrit : 47.1 %  Platelet Count - Automated : 245 K/uL  Mean Cell Volume : 86.6 fL  Mean Cell Hemoglobin : 28.7 pg  Mean Cell Hemoglobin Concentration : 33.1 g/dL  Auto Neutrophil # : 8.31 K/uL  Auto Lymphocyte # : 0.99 K/uL  Auto Monocyte # : 0.22 K/uL  Auto Eosinophil # : 0.00 K/uL  Auto Basophil # : 0.01 K/uL  Auto Neutrophil % : 86.9 %  Auto Lymphocyte % : 10.4 %  Auto Monocyte % : 2.3 %  Auto Eosinophil % : 0.0 %  Auto Basophil % : 0.1 %          Covid test  negative 10/22/21       Type & Screen (in past 72hrs):    CXR:  ordered   EKG: Ordered       Medical Clearances:  Other Clearances:      Orders: NPO after midnight  Monday             Consent: Signed by patient vs HCP                   NAME/NUMBER of HCP:

## 2021-10-25 NOTE — PROGRESS NOTE ADULT - ASSESSMENT
Pt is a 41y Male a/w LBP, being followed by ENT for uvulitis - stable.     ·	cont Decadron 6mg q8h x 3 doses, then can D/C and use for prn if still requires  ·	IV abx - can f/u with ID (consulted), but should be ok to switch to Unasyn or Clinda, no need for Vanco/Ceftriaxone  ·	soft PO diet  ·	ok from ENT perspective to undergo anesthesia tomorrow with neurosurgery, would recommend calling anesthesia to see patient (s/w neurosx, already called anesthesia)  ·	w/d with attng, will follow

## 2021-10-25 NOTE — CONSULT NOTE ADULT - ATTENDING COMMENTS
no acute airway issues   Pt will be observed   plan with medicine
Counseled patient about diagnostic testing and treatment plan. All questions answered.

## 2021-10-25 NOTE — PROGRESS NOTE ADULT - ASSESSMENT
40 YO M with PMHx of R sciatica, renal mass s/p nephrectomy at [facility] in July 2021, hypothyroidism presented to the ED with right back pain    #Moderate spinal stenosis with foraminal stenosis   - MRI Lumbar spine shows moderate spinal stenosis of L4-L5 with moderate foraminal stenosis   - x-ray lumbar spine negative   - continue Robaxin 750mg PO TID  - change oxycodone to oxycodone ER 10mg q12hr, oxycodone 10mg IR  PRN q6hrs  - neurosurgery consult appreciated   - plan for neurosurgery procedure 10/26    #Uvulitis with hemoptysis   - ENT consult appreciated  - CT neck showed uvulitis   - dexa 6mg q8hr ordered  - ceftriaxone 2gm on discharge keflex 500mg qid to complete 7 days (last day 10/31)  - ID consult appreciated    #Hypothyroidism - Levothyroxine 100mcg daily     #Misc  - DVT Prophylaxis: Lovenox   - Diet: Regular   - GI Prophylaxis: Pantoprazole   - Activity: AAT  - Code Status: Full Code  - Pending: neurosurgery procedure 10/26    Patient is medically optimized for neurosurgery procedure

## 2021-10-25 NOTE — CONSULT NOTE ADULT - SUBJECTIVE AND OBJECTIVE BOX
ALISAAJCQUELYN  41y, Male  Allergy: penicillin (Unknown)    CHIEF COMPLAINT: Back pain (24 Oct 2021 20:52)    HPI:  HPI:  42 YO M with PMHx of R sciatica, renal mass s/p nephrectomy at [facility] in July 2021, hypothyroidism presented to the ED with right back pain.   He states that 3 days after the surgery he started noticing worsening R back pain with radiation to his R leg. At that time walking around would alleviate the pain. Almost 2 weeks ago, the pain got worse. States that he was doing his daily stretching today and felt a "pop" on his R back. Since then, the pain has worsened.   Also felt like he has slight difficulty with urination. Denies saddle anesthesia and trouble with bowel movement. Denies fever, recent fall or injury, night sweat, weight loss, and history of IV drug use. Also admits to being on testosterone   Reports that he went to ER a few days ago, had CT and duplex study which were normal. Also saw Dr. Sauer neurologist earlier this month and has MRI and EEG scheduled at the end of November  In the ED VS were unremarkable. Received 6mg IV dexamethasone   Was evaluated by NS and admitted to medicine for further workup  (23 Oct 2021 02:47)      Infectious Diseases History:  ID consulted for Uvulitis with hemoptysis. Leukocytosis on 10/23    Old Micro Data/Cultures:   No culture history during admission    FAMILY HISTORY:    PAST MEDICAL & SURGICAL HISTORY:  HANH (obstructive sleep apnea)    Thyroid disease    Kidney mass    Renal cell cancer    H/O bariatric surgery  gastric sleeve        SOCIAL HISTORY  Social History:      Recent Travel:  Other Exposures:     ROS  General: Denies rigors, nightsweats  HEENT: Denies headache, rhinorrhea, sore throat, eye pain  CV: Denies CP, palpitations  PULM: Denies wheezing, hemoptysis  GI: Denies hematemesis, hematochezia, melena  : Denies discharge, hematuria  MSK: Denies arthralgias, myalgias  SKIN: Denies rash, lesions  NEURO: Denies paresthesias, weakness  PSYCH: Denies depression, anxiety    VITALS:  T(F): 96.8, Max: 97.7 (10-24-21 @ 19:59)  HR: 66  BP: 103/54  RR: 18Vital Signs Last 24 Hrs  T(C): 36 (25 Oct 2021 04:49), Max: 36.5 (24 Oct 2021 19:59)  T(F): 96.8 (25 Oct 2021 04:49), Max: 97.7 (24 Oct 2021 19:59)  HR: 66 (25 Oct 2021 04:49) (66 - 94)  BP: 103/54 (25 Oct 2021 04:49) (103/54 - 133/77)  BP(mean): --  RR: 18 (25 Oct 2021 04:49) (18 - 18)  SpO2: --    PHYSICAL EXAM:  Gen: NAD, resting in bed  HEENT: Normocephalic, atraumatic  Neck: supple, no lymphadenopathy  CV: Regular rate & regular rhythm  Lungs: CTAB  Abdomen: Soft, BS present  Ext: Warm, well perfused  Neuro: non focal, awake  Skin: no rash, no lesions  Lines: no phlebitis    TESTS & MEASUREMENTS:                        15.6   9.56  )-----------( 245      ( 25 Oct 2021 04:30 )             47.1     INFECTIOUS DISEASES TESTING      RADIOLOGY & ADDITIONAL TESTS:  I have personally reviewed the last available CT  CT  < from: CT Neck Soft Tissue w/ IV Cont (10.24.21 @ 19:11) >  Generalized thickening with apparent laceration of the inferior aspect of the uvula possibly representing uvulitis given clinical history. No abnormal fluid collection or lymphadenopathy.    CARDIOLOGY TESTING      All available historical records have been reviewed    MEDICATIONS  acetaminophen     Tablet .. 1000  cefTRIAXone   IVPB 1000  chlorhexidine 0.12% Liquid 15  dexAMETHasone  Injectable 6  enoxaparin Injectable 40  influenza   Vaccine 0.5  levothyroxine 100  lidocaine   4% Patch 1  methocarbamol 750  pantoprazole    Tablet 40  polyethylene glycol 3350 17  senna 2  vancomycin  IVPB 1750      ANTIBIOTICS:  cefTRIAXone   IVPB 1000 milliGRAM(s) IV Intermittent every 24 hours x 1 dose 10/24 (active)  vancomycin  IVPB 1750 milliGRAM(s) IV Intermittent every 12 hours x 2 doses 10/24-10/25 (active)      All available historical data has been reviewed    ASSESSMENT  41yMale with a PMH of....... (fill in)    IMPRESSION  # (?) Sepsis on admission (2 or more of the following T<96.8F, T>101F, Pulse>90, Resp Rate>20, WBC>12, wbc<4, Bands>10%), PLUS (+) lactic acidosis, OR metabolic encephalopathy, OR ROXIE, OR bacteremia . Otherwise just SIRS on admission, sepsis ruled out  #  #    RECOMMENDATIONS  - f/u pending cultures  - ***    This is a pended note. All final recommendations to follow pending discussion with ID Attending    ALISAJACQUELYN  41y, Male  Allergy: penicillin (Unknown)    CHIEF COMPLAINT: Back pain (24 Oct 2021 20:52)    HPI:  HPI:  42 YO M with PMHx of R sciatica, renal mass s/p nephrectomy at [facility] in July 2021, hypothyroidism presented to the ED with right back pain.   He states that 3 days after the surgery he started noticing worsening R back pain with radiation to his R leg. At that time walking around would alleviate the pain. Almost 2 weeks ago, the pain got worse. States that he was doing his daily stretching today and felt a "pop" on his R back. Since then, the pain has worsened.   Also felt like he has slight difficulty with urination. Denies saddle anesthesia and trouble with bowel movement. Denies fever, recent fall or injury, night sweat, weight loss, and history of IV drug use. Also admits to being on testosterone   Reports that he went to ER a few days ago, had CT and duplex study which were normal. Also saw Dr. Sauer neurologist earlier this month and has MRI and EEG scheduled at the end of November  In the ED VS were unremarkable. Received 6mg IV dexamethasone   Was evaluated by NS and admitted to medicine for further workup  (23 Oct 2021 02:47)      Infectious Diseases History:  40 y/o M with PMH of R Sciatica, renal mass s/p nephrectomy, hypothyroidism presented to the ED with right back pain. During admission, patient experienced sensation of needed to expel something from throat and self-induced coughing. Patient subsequently experienced one episode of hemoptysis and pain in back of the throat with foreign body sensation. On prior exam, was noted to have swollen uvula, CT neck showed generalized thickening with apparent laceration of the inferior aspect of the uvula possibly representing uvulitis given clinical history. No abnormal fluid collection or lymphadenopathy. No repeat episodes of coughing or hemoptysis. Patient endorses hoarseness after coughing/hemoptysis and progressive improvement since initiation of steroids and antibiotics. Leukocytosis noted on 10/23. Denies fevers, chills, fatigue, n/v, dysuria, dysphasia to solids or liquids.     Old Micro Data/Cultures:   No culture history during admission    FAMILY HISTORY:    PAST MEDICAL & SURGICAL HISTORY:  HANH (obstructive sleep apnea)    Thyroid disease    Kidney mass    Renal cell cancer    H/O bariatric surgery  gastric sleeve        SOCIAL HISTORY  Social History:      Recent Travel:  Other Exposures:     ROS  General: Denies rigors, nightsweats  HEENT: Denies headache, rhinorrhea, sore throat, eye pain  CV: Denies CP, palpitations  PULM: Denies wheezing, hemoptysis  GI: Denies hematemesis, hematochezia, melena  : Denies discharge, hematuria  MSK: Denies arthralgias, myalgias  SKIN: Denies rash, lesions  NEURO: Denies paresthesias, weakness  PSYCH: Denies depression, anxiety    VITALS:  T(F): 96.8, Max: 97.7 (10-24-21 @ 19:59)  HR: 66  BP: 103/54  RR: 18Vital Signs Last 24 Hrs  T(C): 36 (25 Oct 2021 04:49), Max: 36.5 (24 Oct 2021 19:59)  T(F): 96.8 (25 Oct 2021 04:49), Max: 97.7 (24 Oct 2021 19:59)  HR: 66 (25 Oct 2021 04:49) (66 - 94)  BP: 103/54 (25 Oct 2021 04:49) (103/54 - 133/77)  BP(mean): --  RR: 18 (25 Oct 2021 04:49) (18 - 18)  SpO2: --    PHYSICAL EXAM:  Gen: NAD, resting in bed  HEENT: Normocephalic, atraumatic. Uvula slightly erythematous with white plaques noted on b/l sides.  Neck: supple, no lymphadenopathy  CV: Regular rate & regular rhythm  Lungs: CTAB, unlabored respirations  Abdomen: Soft, nontender, nondistended, BS present  Ext: Warm, well perfused  Neuro: non focal, awake  Skin: no rash, no lesions  Lines: no phlebitis    TESTS & MEASUREMENTS:                        15.6   9.56  )-----------( 245      ( 25 Oct 2021 04:30 )             47.1     INFECTIOUS DISEASES TESTING      RADIOLOGY & ADDITIONAL TESTS:  I have personally reviewed the last available CT  CT  < from: CT Neck Soft Tissue w/ IV Cont (10.24.21 @ 19:11) >  Generalized thickening with apparent laceration of the inferior aspect of the uvula possibly representing uvulitis given clinical history. No abnormal fluid collection or lymphadenopathy.    CARDIOLOGY TESTING      All available historical records have been reviewed    MEDICATIONS  acetaminophen     Tablet .. 1000  cefTRIAXone   IVPB 1000  chlorhexidine 0.12% Liquid 15  dexAMETHasone  Injectable 6  enoxaparin Injectable 40  influenza   Vaccine 0.5  levothyroxine 100  lidocaine   4% Patch 1  methocarbamol 750  pantoprazole    Tablet 40  polyethylene glycol 3350 17  senna 2  vancomycin  IVPB 1750      ANTIBIOTICS:  cefTRIAXone   IVPB 1000 milliGRAM(s) IV Intermittent every 24 hours x 1 dose 10/24 (active)  vancomycin  IVPB 1750 milliGRAM(s) IV Intermittent every 12 hours x 2 doses 10/24-10/25 (active)      All available historical data has been reviewed    ASSESSMENT  40 y/o M with PMH of R Sciatica, renal mass s/p nephrectomy, hypothyroidism presented to the ED with right back pain. During admission, patient experienced sensation of needed to expel something from throat and self-induced coughing. Patient subsequently experienced one episode of hemoptysis and pain in back of the throat with foreign body sensation. On prior exam, was noted to have swollen uvula, CT neck showed generalized thickening with apparent laceration of the inferior aspect of the uvula possibly representing uvulitis given clinical history. No abnormal fluid collection or lymphadenopathy. No repeat episodes of coughing or hemoptysis. Patient endorses hoarseness after coughing/hemoptysis and progressive improvement since initiation of steroids and antibiotics. Leukocytosis noted on 10/23. Denies fevers, chills, fatigue, n/v, dysuria, dysphasia to solids or liquids.     IMPRESSION  # Uvulitis  #  #    RECOMMENDATIONS  - f/u pending cultures  - ***    This is a pended note. All final recommendations to follow pending discussion with ID Attending    ALISAJACQUELYN  41y, Male  Allergy: penicillin (Unknown)    CHIEF COMPLAINT: Back pain (24 Oct 2021 20:52)    HPI:  HPI:  42 YO M with PMHx of R sciatica, renal mass s/p nephrectomy at [facility] in July 2021, hypothyroidism presented to the ED with right back pain.   He states that 3 days after the surgery he started noticing worsening R back pain with radiation to his R leg. At that time walking around would alleviate the pain. Almost 2 weeks ago, the pain got worse. States that he was doing his daily stretching today and felt a "pop" on his R back. Since then, the pain has worsened.   Also felt like he has slight difficulty with urination. Denies saddle anesthesia and trouble with bowel movement. Denies fever, recent fall or injury, night sweat, weight loss, and history of IV drug use. Also admits to being on testosterone   Reports that he went to ER a few days ago, had CT and duplex study which were normal. Also saw Dr. Sauer neurologist earlier this month and has MRI and EEG scheduled at the end of November  In the ED VS were unremarkable. Received 6mg IV dexamethasone   Was evaluated by NS and admitted to medicine for further workup  (23 Oct 2021 02:47)      Infectious Diseases History:  42 y/o M with PMH of R Sciatica, renal mass s/p nephrectomy, hypothyroidism presented to the ED with right back pain. During admission, patient experienced sensation of needed to expel something from throat and self-induced coughing. Patient subsequently experienced one episode of hemoptysis and pain in back of the throat with foreign body sensation. On prior exam, was noted to have swollen uvula, CT neck showed generalized thickening with apparent laceration of the inferior aspect of the uvula possibly representing uvulitis given clinical history. No abnormal fluid collection or lymphadenopathy. No repeat episodes of coughing or hemoptysis. Patient endorses hoarseness after coughing/hemoptysis and progressive improvement since initiation of steroids and antibiotics. Leukocytosis noted on 10/23. Denies fevers, chills, fatigue, n/v, dysuria, dysphasia to solids or liquids.     Old Micro Data/Cultures:   No culture history during admission    FAMILY HISTORY:    PAST MEDICAL & SURGICAL HISTORY:  HANH (obstructive sleep apnea)    Thyroid disease    Kidney mass    Renal cell cancer    H/O bariatric surgery  gastric sleeve        SOCIAL HISTORY  Social History:      Recent Travel:  Other Exposures:     ROS  General: Denies rigors, nightsweats  HEENT: Denies headache, rhinorrhea, sore throat, eye pain  CV: Denies CP, palpitations  PULM: Denies wheezing, hemoptysis  GI: Denies hematemesis, hematochezia, melena  : Denies discharge, hematuria  MSK: Denies arthralgias, myalgias  SKIN: Denies rash, lesions  NEURO: Denies paresthesias, weakness  PSYCH: Denies depression, anxiety    VITALS:  T(F): 96.8, Max: 97.7 (10-24-21 @ 19:59)  HR: 66  BP: 103/54  RR: 18Vital Signs Last 24 Hrs  T(C): 36 (25 Oct 2021 04:49), Max: 36.5 (24 Oct 2021 19:59)  T(F): 96.8 (25 Oct 2021 04:49), Max: 97.7 (24 Oct 2021 19:59)  HR: 66 (25 Oct 2021 04:49) (66 - 94)  BP: 103/54 (25 Oct 2021 04:49) (103/54 - 133/77)  BP(mean): --  RR: 18 (25 Oct 2021 04:49) (18 - 18)  SpO2: --    PHYSICAL EXAM:  Gen: NAD, resting in bed  HEENT: Normocephalic, atraumatic. Uvula slightly erythematous with white plaques noted on b/l sides.  Neck: supple, no lymphadenopathy  CV: Regular rate & regular rhythm  Lungs: CTAB, unlabored respirations  Abdomen: Soft, nontender, nondistended, BS present  Ext: Warm, well perfused  Neuro: non focal, awake  Skin: no rash, no lesions  Lines: no phlebitis    TESTS & MEASUREMENTS:                        15.6   9.56  )-----------( 245      ( 25 Oct 2021 04:30 )             47.1     INFECTIOUS DISEASES TESTING      RADIOLOGY & ADDITIONAL TESTS:  I have personally reviewed the last available CT  CT  < from: CT Neck Soft Tissue w/ IV Cont (10.24.21 @ 19:11) >  Generalized thickening with apparent laceration of the inferior aspect of the uvula possibly representing uvulitis given clinical history. No abnormal fluid collection or lymphadenopathy.    CARDIOLOGY TESTING      All available historical records have been reviewed    MEDICATIONS  acetaminophen     Tablet .. 1000  cefTRIAXone   IVPB 1000  chlorhexidine 0.12% Liquid 15  dexAMETHasone  Injectable 6  enoxaparin Injectable 40  influenza   Vaccine 0.5  levothyroxine 100  lidocaine   4% Patch 1  methocarbamol 750  pantoprazole    Tablet 40  polyethylene glycol 3350 17  senna 2  vancomycin  IVPB 1750      ANTIBIOTICS:  cefTRIAXone   IVPB 1000 milliGRAM(s) IV Intermittent every 24 hours x 1 dose 10/24 (active)  vancomycin  IVPB 1750 milliGRAM(s) IV Intermittent every 12 hours x 2 doses 10/24-10/25 (active)      All available historical data has been reviewed    ASSESSMENT  42 y/o M with PMH of R Sciatica, renal mass s/p nephrectomy, hypothyroidism presented to the ED with right back pain. During admission, patient experienced sensation of needed to expel something from throat and self-induced coughing. Patient subsequently experienced one episode of hemoptysis and pain in back of the throat with foreign body sensation. On prior exam, was noted to have swollen uvula, CT neck showed generalized thickening with apparent laceration of the inferior aspect of the uvula possibly representing uvulitis given clinical history. No abnormal fluid collection or lymphadenopathy. No repeat episodes of coughing or hemoptysis. Patient endorses hoarseness after coughing/hemoptysis and progressive improvement since initiation of steroids and antibiotics. Leukocytosis noted on 10/23. Denies fevers, chills, fatigue, n/v, dysuria, dysphasia to solids or liquids.     IMPRESSION  # Uvulitis secondary to cough      RECOMMENDATIONS  - Rocephin 2g q24 7 days. Can d/c on Keflex 500mg q6 to complete 7-day course   ALISAJACQUELYN  41y, Male  Allergy: penicillin (Unknown)    CHIEF COMPLAINT: Back pain (24 Oct 2021 20:52)    HPI:  HPI:  40 YO M with PMHx of R sciatica, renal mass s/p nephrectomy at [facility] in July 2021, hypothyroidism presented to the ED with right back pain.   He states that 3 days after the surgery he started noticing worsening R back pain with radiation to his R leg. At that time walking around would alleviate the pain. Almost 2 weeks ago, the pain got worse. States that he was doing his daily stretching today and felt a "pop" on his R back. Since then, the pain has worsened.   Also felt like he has slight difficulty with urination. Denies saddle anesthesia and trouble with bowel movement. Denies fever, recent fall or injury, night sweat, weight loss, and history of IV drug use. Also admits to being on testosterone   Reports that he went to ER a few days ago, had CT and duplex study which were normal. Also saw Dr. Sauer neurologist earlier this month and has MRI and EEG scheduled at the end of November  In the ED VS were unremarkable. Received 6mg IV dexamethasone   Was evaluated by NS and admitted to medicine for further workup  (23 Oct 2021 02:47)      Infectious Diseases History:  42 y/o M with PMH of R Sciatica, renal mass s/p nephrectomy, hypothyroidism presented to the ED with right back pain. During admission, patient experienced sensation of needed to expel something from throat and self-induced coughing. Patient subsequently experienced one episode of hemoptysis and pain in back of the throat with foreign body sensation. On prior exam, was noted to have swollen uvula, CT neck showed generalized thickening with apparent laceration of the inferior aspect of the uvula possibly representing uvulitis given clinical history. No abnormal fluid collection or lymphadenopathy. No repeat episodes of coughing or hemoptysis. Patient endorses hoarseness after coughing/hemoptysis and progressive improvement since initiation of steroids and antibiotics. Leukocytosis noted on 10/23. Denies fevers, chills, fatigue, n/v, dysuria, dysphasia to solids or liquids.     Old Micro Data/Cultures:   No culture history during admission    FAMILY HISTORY:    PAST MEDICAL & SURGICAL HISTORY:  HANH (obstructive sleep apnea)    Thyroid disease    Kidney mass    Renal cell cancer    H/O bariatric surgery  gastric sleeve        SOCIAL HISTORY  Social History:      Recent Travel:  Other Exposures:     ROS  General: Denies rigors, nightsweats  HEENT: Denies headache, rhinorrhea, sore throat, eye pain  CV: Denies CP, palpitations  PULM: Denies wheezing, hemoptysis  GI: Denies hematemesis, hematochezia, melena  : Denies discharge, hematuria  MSK: Denies arthralgias, myalgias  SKIN: Denies rash, lesions  NEURO: Denies paresthesias, weakness  PSYCH: Denies depression, anxiety    VITALS:  T(F): 96.8, Max: 97.7 (10-24-21 @ 19:59)  HR: 66  BP: 103/54  RR: 18Vital Signs Last 24 Hrs  T(C): 36 (25 Oct 2021 04:49), Max: 36.5 (24 Oct 2021 19:59)  T(F): 96.8 (25 Oct 2021 04:49), Max: 97.7 (24 Oct 2021 19:59)  HR: 66 (25 Oct 2021 04:49) (66 - 94)  BP: 103/54 (25 Oct 2021 04:49) (103/54 - 133/77)  BP(mean): --  RR: 18 (25 Oct 2021 04:49) (18 - 18)  SpO2: --    PHYSICAL EXAM:  Gen: NAD, resting in bed  HEENT: Normocephalic, atraumatic. Uvula slightly erythematous with white plaques noted on b/l sides.  Neck: supple, no lymphadenopathy  CV: Regular rate & regular rhythm  Lungs: CTAB, unlabored respirations  Abdomen: Soft, nontender, nondistended, BS present  Ext: Warm, well perfused  Neuro: non focal, awake  Skin: no rash, no lesions  Lines: no phlebitis    TESTS & MEASUREMENTS:                        15.6   9.56  )-----------( 245      ( 25 Oct 2021 04:30 )             47.1     INFECTIOUS DISEASES TESTING      RADIOLOGY & ADDITIONAL TESTS:  I have personally reviewed the last available CT  CT  < from: CT Neck Soft Tissue w/ IV Cont (10.24.21 @ 19:11) >  Generalized thickening with apparent laceration of the inferior aspect of the uvula possibly representing uvulitis given clinical history. No abnormal fluid collection or lymphadenopathy.    CARDIOLOGY TESTING      All available historical records have been reviewed    MEDICATIONS  acetaminophen     Tablet .. 1000  cefTRIAXone   IVPB 1000  chlorhexidine 0.12% Liquid 15  dexAMETHasone  Injectable 6  enoxaparin Injectable 40  influenza   Vaccine 0.5  levothyroxine 100  lidocaine   4% Patch 1  methocarbamol 750  pantoprazole    Tablet 40  polyethylene glycol 3350 17  senna 2  vancomycin  IVPB 1750      ANTIBIOTICS:  cefTRIAXone   IVPB 1000 milliGRAM(s) IV Intermittent every 24 hours x 1 dose 10/24 (active)  vancomycin  IVPB 1750 milliGRAM(s) IV Intermittent every 12 hours x 2 doses 10/24-10/25 (active)      All available historical data has been reviewed    ASSESSMENT  42 y/o M with PMH of R Sciatica, renal mass s/p nephrectomy, hypothyroidism presented to the ED with right back pain. During admission, patient experienced sensation of needed to expel something from throat and self-induced coughing. Patient subsequently experienced one episode of hemoptysis and pain in back of the throat with foreign body sensation. On prior exam, was noted to have swollen uvula, CT neck showed generalized thickening with apparent laceration of the inferior aspect of the uvula possibly representing uvulitis given clinical history. No abnormal fluid collection or lymphadenopathy. No repeat episodes of coughing or hemoptysis. Patient endorses hoarseness after coughing/hemoptysis and progressive improvement since initiation of steroids and antibiotics. Leukocytosis noted on 10/23. Denies fevers, chills, fatigue, n/v, dysuria, dysphasia to solids or liquids.     IMPRESSION  # Uvulitis secondary to cough      RECOMMENDATIONS  - Rocephin 2g q24h . Can d/c on Keflex 500mg q6 to complete 7-day course  recall prn please

## 2021-10-25 NOTE — PRE-ANESTHESIA EVALUATION ADULT - NSANTHPMHFT_GEN_ALL_CORE
Chart reviewed, pt interviewed and examined.  HANH on CPAP. H/O recent Uvulitis seen by ENT- please see ENT evaluation and clearance for surgery.

## 2021-10-25 NOTE — PROGRESS NOTE ADULT - SUBJECTIVE AND OBJECTIVE BOX
ENT DAILY PROGRESS NOTE    Pt is a 41y Male a/w LBP, being followed by ENT for uvulitis - seen and examined at bedside. Pt doing well, feels like he improved overall but does feel his uvula more today than yesterday. PT tolerating PO. To note, pt lying flat in the bed during interview and exam.       REVIEW OF SYSTEMS   [x] A ten-point review of systems was otherwise negative except as noted.    Allergies    penicillin (Unknown)    Intolerances    MEDICATIONS:  acetaminophen     Tablet .. 1000 milliGRAM(s) Oral every 8 hours  cefTRIAXone   IVPB 1000 milliGRAM(s) IV Intermittent every 24 hours  chlorhexidine 0.12% Liquid 15 milliLiter(s) Oral Mucosa two times a day  dexAMETHasone  Injectable 6 milliGRAM(s) IV Push every 8 hours  enoxaparin Injectable 40 milliGRAM(s) SubCutaneous daily  influenza   Vaccine 0.5 milliLiter(s) IntraMuscular once  levothyroxine 100 MICROGram(s) Oral daily  lidocaine   4% Patch 1 Patch Transdermal daily  methocarbamol 750 milliGRAM(s) Oral three times a day  morphine  - Injectable 4 milliGRAM(s) IV Push every 6 hours PRN  oxyCODONE    IR 5 milliGRAM(s) Oral every 4 hours PRN  oxyCODONE    IR 10 milliGRAM(s) Oral every 4 hours PRN  pantoprazole    Tablet 40 milliGRAM(s) Oral before breakfast  polyethylene glycol 3350 17 Gram(s) Oral daily  senna 2 Tablet(s) Oral at bedtime  vancomycin  IVPB 1750 milliGRAM(s) IV Intermittent every 12 hours      Vital Signs Last 24 Hrs  T(C): 36 (25 Oct 2021 04:49), Max: 36.5 (24 Oct 2021 19:59)  T(F): 96.8 (25 Oct 2021 04:49), Max: 97.7 (24 Oct 2021 19:59)  HR: 66 (25 Oct 2021 04:49) (66 - 94)  BP: 103/54 (25 Oct 2021 04:49) (103/54 - 133/77)  RR: 18 (25 Oct 2021 04:49) (18 - 18)    PHYSICAL EXAM:    GEN: NAD, awake and alert. No drooling or pooling of secretions. No stridor or stertor. Good vocal quality, no hoarseness.   SKIN: Good color, non diaphoretic  HEENT: Oral mucosa pink and moist. + erythema and ulcerations noted to the uvula. no active bleeding noted. No erythema or edema noted to buccal mucosa, tongue, FOM or posterior oropharynx. Uvula midline  NECK:  Trachea midline. Neck supple, no TTP to B/L lateral neck, no cervical LAD.  RESP: No dyspnea, non-labored breathing. No use of accessory muscles.  CARDIO: +S1/S2  ABDO: Soft, NT.  EXT: CARMEN x 4    LABS:  CBC-                        15.6   9.56  )-----------( 245      ( 25 Oct 2021 04:30 )             47.1     BMP/CMP-  25 Oct 2021 04:30    136    |  99     |  17     ----------------------------<  126    4.5     |  20     |  1.0      Ca    9.5        25 Oct 2021 04:30      Coagulation Studies-    Endocrine Panel-  Calcium, Total Serum: 9.5 mg/dL (10-25 @ 04:30)    RADIOLOGY & ADDITIONAL STUDIES:    < from: CT Neck Soft Tissue w/ IV Cont (10.24.21 @ 19:11) >    EXAM:  CT NECK SOFT TISSUE IC            PROCEDURE DATE:  10/24/2021        INTERPRETATION:  INDICATIONS:  Uvulitis    TECHNIQUE:   Axial images were obtained following the intravenous administration of contrast material. Sagittal and coronal reformatted images were obtained. 70 cc Optiray were administered. 30 cc were discarded.    COMPARISON EXAMINATION:  None.    FINDINGS:  AERODIGESTIVE TRACT:  The uvula is slightly thickened with apparent laceration involving the left inferior lateral aspect. The aerodigestive tract is otherwise unremarkable.    LYMPH NODES:  No adenopathy.    PAROTID GLANDS:  Normal.    SUBMANDIBULAR GLANDS:  Normal.    THYROID GLAND:  Normal.    VISUALIZED PARANASAL SINUSES:  Clear.    VISUALIZED TYMPANOMASTOID CAVITIES: Clear.    BONES:  Normal.    MISCELLANEOUS:  None.      IMPRESSION:  Generalized thickening with apparent laceration of the inferior aspect of the uvula possibly representing uvulitis given clinical history. No abnormal fluid collection or lymphadenopathy.    --- End of Report ---        DALTON PLUMMER MD; Attending Radiologist  This document has been electronically signed. Oct 25 2021  8:31AM

## 2021-10-26 ENCOUNTER — RESULT REVIEW (OUTPATIENT)
Age: 41
End: 2021-10-26

## 2021-10-26 PROCEDURE — 63042 LAMINOTOMY SINGLE LUMBAR: CPT | Mod: RT

## 2021-10-26 PROCEDURE — 88311 DECALCIFY TISSUE: CPT | Mod: 26

## 2021-10-26 PROCEDURE — 99232 SBSQ HOSP IP/OBS MODERATE 35: CPT

## 2021-10-26 PROCEDURE — 63042 LAMINOTOMY SINGLE LUMBAR: CPT | Mod: AS

## 2021-10-26 PROCEDURE — 88304 TISSUE EXAM BY PATHOLOGIST: CPT | Mod: 26

## 2021-10-26 RX ORDER — HEPARIN SODIUM 5000 [USP'U]/ML
5000 INJECTION INTRAVENOUS; SUBCUTANEOUS EVERY 12 HOURS
Refills: 0 | Status: DISCONTINUED | OUTPATIENT
Start: 2021-10-26 | End: 2021-10-27

## 2021-10-26 RX ORDER — ACETAMINOPHEN 500 MG
1000 TABLET ORAL EVERY 8 HOURS
Refills: 0 | Status: DISCONTINUED | OUTPATIENT
Start: 2021-10-26 | End: 2021-10-27

## 2021-10-26 RX ORDER — SENNA PLUS 8.6 MG/1
2 TABLET ORAL AT BEDTIME
Refills: 0 | Status: DISCONTINUED | OUTPATIENT
Start: 2021-10-26 | End: 2021-10-27

## 2021-10-26 RX ORDER — SODIUM CHLORIDE 9 MG/ML
1000 INJECTION, SOLUTION INTRAVENOUS
Refills: 0 | Status: DISCONTINUED | OUTPATIENT
Start: 2021-10-26 | End: 2021-10-26

## 2021-10-26 RX ORDER — MORPHINE SULFATE 50 MG/1
4 CAPSULE, EXTENDED RELEASE ORAL EVERY 6 HOURS
Refills: 0 | Status: DISCONTINUED | OUTPATIENT
Start: 2021-10-26 | End: 2021-10-27

## 2021-10-26 RX ORDER — CHLORHEXIDINE GLUCONATE 213 G/1000ML
15 SOLUTION TOPICAL
Refills: 0 | Status: DISCONTINUED | OUTPATIENT
Start: 2021-10-26 | End: 2021-10-27

## 2021-10-26 RX ORDER — LIDOCAINE 4 G/100G
1 CREAM TOPICAL DAILY
Refills: 0 | Status: DISCONTINUED | OUTPATIENT
Start: 2021-10-26 | End: 2021-10-27

## 2021-10-26 RX ORDER — DEXAMETHASONE 0.5 MG/5ML
6 ELIXIR ORAL EVERY 8 HOURS
Refills: 0 | Status: DISCONTINUED | OUTPATIENT
Start: 2021-10-26 | End: 2021-10-27

## 2021-10-26 RX ORDER — POLYETHYLENE GLYCOL 3350 17 G/17G
17 POWDER, FOR SOLUTION ORAL DAILY
Refills: 0 | Status: DISCONTINUED | OUTPATIENT
Start: 2021-10-26 | End: 2021-10-27

## 2021-10-26 RX ORDER — OXYCODONE HYDROCHLORIDE 5 MG/1
10 TABLET ORAL EVERY 4 HOURS
Refills: 0 | Status: DISCONTINUED | OUTPATIENT
Start: 2021-10-26 | End: 2021-10-27

## 2021-10-26 RX ORDER — METHOCARBAMOL 500 MG/1
750 TABLET, FILM COATED ORAL THREE TIMES A DAY
Refills: 0 | Status: DISCONTINUED | OUTPATIENT
Start: 2021-10-26 | End: 2021-10-27

## 2021-10-26 RX ORDER — OXYCODONE HYDROCHLORIDE 5 MG/1
5 TABLET ORAL EVERY 4 HOURS
Refills: 0 | Status: DISCONTINUED | OUTPATIENT
Start: 2021-10-26 | End: 2021-10-27

## 2021-10-26 RX ORDER — HYDROMORPHONE HYDROCHLORIDE 2 MG/ML
0.5 INJECTION INTRAMUSCULAR; INTRAVENOUS; SUBCUTANEOUS
Refills: 0 | Status: DISCONTINUED | OUTPATIENT
Start: 2021-10-26 | End: 2021-10-26

## 2021-10-26 RX ORDER — LEVOTHYROXINE SODIUM 125 MCG
100 TABLET ORAL DAILY
Refills: 0 | Status: DISCONTINUED | OUTPATIENT
Start: 2021-10-26 | End: 2021-10-27

## 2021-10-26 RX ORDER — CEFTRIAXONE 500 MG/1
2000 INJECTION, POWDER, FOR SOLUTION INTRAMUSCULAR; INTRAVENOUS EVERY 24 HOURS
Refills: 0 | Status: DISCONTINUED | OUTPATIENT
Start: 2021-10-26 | End: 2021-10-27

## 2021-10-26 RX ORDER — PANTOPRAZOLE SODIUM 20 MG/1
40 TABLET, DELAYED RELEASE ORAL
Refills: 0 | Status: DISCONTINUED | OUTPATIENT
Start: 2021-10-26 | End: 2021-10-27

## 2021-10-26 RX ADMIN — Medication 1000 MILLIGRAM(S): at 00:30

## 2021-10-26 RX ADMIN — Medication 1000 MILLIGRAM(S): at 21:46

## 2021-10-26 RX ADMIN — METHOCARBAMOL 750 MILLIGRAM(S): 500 TABLET, FILM COATED ORAL at 05:34

## 2021-10-26 RX ADMIN — MORPHINE SULFATE 4 MILLIGRAM(S): 50 CAPSULE, EXTENDED RELEASE ORAL at 23:20

## 2021-10-26 RX ADMIN — HYDROMORPHONE HYDROCHLORIDE 0.5 MILLIGRAM(S): 2 INJECTION INTRAMUSCULAR; INTRAVENOUS; SUBCUTANEOUS at 13:15

## 2021-10-26 RX ADMIN — CHLORHEXIDINE GLUCONATE 15 MILLILITER(S): 213 SOLUTION TOPICAL at 17:22

## 2021-10-26 RX ADMIN — PANTOPRAZOLE SODIUM 40 MILLIGRAM(S): 20 TABLET, DELAYED RELEASE ORAL at 06:43

## 2021-10-26 RX ADMIN — Medication 1000 MILLIGRAM(S): at 23:20

## 2021-10-26 RX ADMIN — CHLORHEXIDINE GLUCONATE 15 MILLILITER(S): 213 SOLUTION TOPICAL at 05:33

## 2021-10-26 RX ADMIN — HYDROMORPHONE HYDROCHLORIDE 0.5 MILLIGRAM(S): 2 INJECTION INTRAMUSCULAR; INTRAVENOUS; SUBCUTANEOUS at 12:26

## 2021-10-26 RX ADMIN — METHOCARBAMOL 750 MILLIGRAM(S): 500 TABLET, FILM COATED ORAL at 14:20

## 2021-10-26 RX ADMIN — SODIUM CHLORIDE 75 MILLILITER(S): 9 INJECTION, SOLUTION INTRAVENOUS at 12:25

## 2021-10-26 RX ADMIN — HEPARIN SODIUM 5000 UNIT(S): 5000 INJECTION INTRAVENOUS; SUBCUTANEOUS at 17:20

## 2021-10-26 RX ADMIN — OXYCODONE HYDROCHLORIDE 5 MILLIGRAM(S): 5 TABLET ORAL at 17:19

## 2021-10-26 RX ADMIN — Medication 1000 MILLIGRAM(S): at 05:31

## 2021-10-26 RX ADMIN — OXYCODONE HYDROCHLORIDE 10 MILLIGRAM(S): 5 TABLET ORAL at 03:07

## 2021-10-26 RX ADMIN — CEFTRIAXONE 100 MILLIGRAM(S): 500 INJECTION, POWDER, FOR SOLUTION INTRAMUSCULAR; INTRAVENOUS at 17:20

## 2021-10-26 RX ADMIN — Medication 100 MICROGRAM(S): at 05:32

## 2021-10-26 RX ADMIN — SENNA PLUS 2 TABLET(S): 8.6 TABLET ORAL at 21:47

## 2021-10-26 RX ADMIN — Medication 1000 MILLIGRAM(S): at 14:58

## 2021-10-26 RX ADMIN — Medication 6 MILLIGRAM(S): at 13:28

## 2021-10-26 RX ADMIN — MORPHINE SULFATE 4 MILLIGRAM(S): 50 CAPSULE, EXTENDED RELEASE ORAL at 15:04

## 2021-10-26 RX ADMIN — Medication 6 MILLIGRAM(S): at 05:33

## 2021-10-26 RX ADMIN — MORPHINE SULFATE 4 MILLIGRAM(S): 50 CAPSULE, EXTENDED RELEASE ORAL at 21:44

## 2021-10-26 RX ADMIN — OXYCODONE HYDROCHLORIDE 10 MILLIGRAM(S): 5 TABLET ORAL at 21:31

## 2021-10-26 RX ADMIN — HYDROMORPHONE HYDROCHLORIDE 0.5 MILLIGRAM(S): 2 INJECTION INTRAMUSCULAR; INTRAVENOUS; SUBCUTANEOUS at 12:45

## 2021-10-26 RX ADMIN — Medication 1000 MILLIGRAM(S): at 06:43

## 2021-10-26 RX ADMIN — OXYCODONE HYDROCHLORIDE 10 MILLIGRAM(S): 5 TABLET ORAL at 19:50

## 2021-10-26 RX ADMIN — Medication 6 MILLIGRAM(S): at 21:46

## 2021-10-26 RX ADMIN — METHOCARBAMOL 750 MILLIGRAM(S): 500 TABLET, FILM COATED ORAL at 21:46

## 2021-10-26 RX ADMIN — HYDROMORPHONE HYDROCHLORIDE 0.5 MILLIGRAM(S): 2 INJECTION INTRAMUSCULAR; INTRAVENOUS; SUBCUTANEOUS at 13:00

## 2021-10-26 RX ADMIN — OXYCODONE HYDROCHLORIDE 10 MILLIGRAM(S): 5 TABLET ORAL at 05:36

## 2021-10-26 RX ADMIN — OXYCODONE HYDROCHLORIDE 10 MILLIGRAM(S): 5 TABLET ORAL at 06:44

## 2021-10-26 NOTE — PROGRESS NOTE ADULT - SUBJECTIVE AND OBJECTIVE BOX
Pt is a 42 yo M who presented w/ LBP in setting of moderate lumbar stenosis with moderate foraminal stenosis, and developed uvulitis during hospital stay.       INTERVAL HPI/OVERNIGHT EVENTS:  Pt was unable to be seen this AM as pt was taken by neurosurgery for his L4-5 microlumbar diskectomy procedure as planned. Per sign out, no complains overnight and no requests for more pain meds.       T(C): 36.2 (10-26-21 @ 08:15), Max: 36.6 (10-25-21 @ 21:26)  HR: 71 (10-26-21 @ 08:15) (70 - 88)  BP: 127/65 (10-26-21 @ 08:15) (111/67 - 143/61)  RR: 16 (10-26-21 @ 08:15) (16 - 18)  SpO2: 100% (10-26-21 @ 08:15) (100% - 100%)  Wt(kg): --Vital Signs Last 24 Hrs  T(C): 36.2 (26 Oct 2021 08:15), Max: 36.6 (25 Oct 2021 21:26)  T(F): 97.2 (26 Oct 2021 07:16), Max: 97.9 (25 Oct 2021 21:26)  HR: 71 (26 Oct 2021 08:15) (70 - 88)  BP: 127/65 (26 Oct 2021 08:15) (111/67 - 143/61)  BP(mean): 84 (26 Oct 2021 08:15) (84 - 92)  RR: 16 (26 Oct 2021 08:15) (16 - 18)  SpO2: 100% (26 Oct 2021 08:15) (100% - 100%)  penicillin (Unknown)      PHYSICAL EXAM (from yesterday 10/25):    GENERAL: NAD, well-groomed, well-developed  HEAD:  Atraumatic, Normocephalic  ENMT: No tonsillar erythema, exudates, or enlargement; Moist mucous membranes, Good dentition, No lesions; unable to visualize uvula w/o tongue depressor.   NERVOUS SYSTEM:  Alert & Oriented X3, Good concentration;  CHEST/LUNG: Clear to percussion bilaterally; No rales, rhonchi, wheezing, or rubs  HEART: Regular rate and rhythm; No murmurs, rubs, or gallops  ABDOMEN: Soft, Nontender, Nondistended; Bowel sounds present      LABS:        RADIOLOGY & ADDITIONAL TESTS:  CT Neck Soft Tissue w/ IV Cont (10.24.21 @ 19:11)  IMPRESSION:  Generalized thickening with apparent laceration of the inferior aspect of the uvula possibly representing uvulitis given clinical history. No abnormal fluid collection or lymphadenopathy.      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 There are 4 rib-free lumbar type vertebrae using this counting method.    1.3 cm right paracentral disc extrusion at L4-5 effacing the right lateral recess with posterior displacement of the descending L5 nerve roots. This results in moderate spinal stenosis and moderate right worse than left foraminal stenosis      Xray Lumbar Spine AP + Lateral (10.22.21 @ 18:18) >  FINDINGS: There is a transitional vertebra with sacralization of L5.  There is normal-appearing alignment of the vertebral bodies.  Vertebral body heights appear normal.  No acute fracture or dislocation is seen.  No definite disc space narrowing is seen.  The remainder of visualized bones are grossly unremarkable.  There is a non-obstructive bowel gas pattern.  No abnormal masses or calcifications are seen.    IMPRESSION: No acute fracture or dislocation is seen.

## 2021-10-26 NOTE — PROGRESS NOTE ADULT - ASSESSMENT
40 YO M with PMHx of R sciatica, renal mass s/p nephrectomy at [facility] in July 2021, hypothyroidism presented to the ED with right back pain    #Moderate spinal stenosis with foraminal stenosis   - MRI Lumbar spine shows moderate spinal stenosis of L4-L5 with moderate foraminal stenosis   - x-ray lumbar spine negative   - continue Robaxin 750mg PO TID  - change oxycodone to oxycodone ER 10mg q12hr, oxycodone 10mg IR  PRN q6hrs  - neurosurgery consult appreciated   - laminectomy completed today by neurosurgery     #Uvulitis with hemoptysis   - ENT consult appreciated  - CT neck showed uvulitis   - dexa 6mg q8hr ordered  - ceftriaxone 2gm on discharge keflex 500mg qid to complete 7 days (last day 10/31)  - ID consult appreciated    #Hypothyroidism - Levothyroxine 100mcg daily     #Misc  - DVT Prophylaxis: Lovenox   - Diet: Regular   - GI Prophylaxis: Pantoprazole   - Activity: AAT  - Code Status: Full Code  - Pending: pod day 0 from laminectomy        42 YO M with PMHx of R sciatica, renal mass s/p nephrectomy at [facility] in July 2021, hypothyroidism presented to the ED with right back pain    #Moderate spinal stenosis with foraminal stenosis   - MRI Lumbar spine shows moderate spinal stenosis of L4-L5 with moderate foraminal stenosis   - x-ray lumbar spine negative   - continue Robaxin 750mg PO TID  - change oxycodone to oxycodone ER 10mg q12hr, oxycodone 10mg IR  PRN q6hrs  - neurosurgery consult appreciated   - laminectomy completed today by neurosurgery     #Uvulitis with hemoptysis   - ENT consult appreciated  - CT neck showed uvulitis   - dexa 6mg q8hr stopped 10/26  - ceftriaxone 2gm on discharge keflex 500mg qid to complete 7 days (last day 10/31)  - ID consult appreciated    #Hypothyroidism - Levothyroxine 100mcg daily     #Misc  - DVT Prophylaxis: Lovenox   - Diet: Regular   - GI Prophylaxis: Pantoprazole   - Activity: AAT  - Code Status: Full Code  - Pending: pod day 0 from laminectomy

## 2021-10-26 NOTE — PROGRESS NOTE ADULT - SUBJECTIVE AND OBJECTIVE BOX
Neurosurgery Post Operative Note      POD# 0  S/P Right L4-5 MLD    Pt seen and examined at the bedside in PACU.  The pt was successfully extubated intraoperative.      Subjective: 41yMale with a pmhx of INTRACTABLE BACK PAIN    ^CALF PAIN    Handoff    MEWS Score    HANH (obstructive sleep apnea)    Thyroid disease    Kidney mass    Renal cell cancer    Back pain    Intractable back pain    Revision, laminectomy, lumbar    H/O bariatric surgery    CALF PAIN    90+    SysAdmin_VisitLink      POD# 0  S/P Right L4-5 MLD      Allergies    penicillin (Unknown)    Intolerances        Vital Signs Last 24 Hrs  T(C): 36.2 (26 Oct 2021 08:15), Max: 36.6 (25 Oct 2021 21:26)  T(F): 97.2 (26 Oct 2021 07:16), Max: 97.9 (25 Oct 2021 21:26)  HR: 71 (26 Oct 2021 08:15) (70 - 88)  BP: 127/65 (26 Oct 2021 08:15) (111/67 - 128/68)  BP(mean): 84 (26 Oct 2021 08:15) (84 - 92)  RR: 16 (26 Oct 2021 08:15) (16 - 18)  SpO2: 100% (26 Oct 2021 08:15) (100% - 100%)      acetaminophen     Tablet .. 1000 milliGRAM(s) Oral every 8 hours  cefTRIAXone   IVPB 2000 milliGRAM(s) IV Intermittent every 24 hours  chlorhexidine 0.12% Liquid 15 milliLiter(s) Oral Mucosa two times a day  dexAMETHasone  Injectable 6 milliGRAM(s) IV Push every 8 hours  HYDROmorphone  Injectable 0.5 milliGRAM(s) IV Push every 10 minutes PRN  influenza   Vaccine 0.5 milliLiter(s) IntraMuscular once  lactated ringers. 1000 milliLiter(s) IV Continuous <Continuous>  levothyroxine 100 MICROGram(s) Oral daily  lidocaine   4% Patch 1 Patch Transdermal daily  methocarbamol 750 milliGRAM(s) Oral three times a day  morphine  - Injectable 4 milliGRAM(s) IV Push every 6 hours PRN  oxyCODONE    IR 5 milliGRAM(s) Oral every 4 hours PRN  oxyCODONE    IR 10 milliGRAM(s) Oral every 4 hours PRN  pantoprazole    Tablet 40 milliGRAM(s) Oral before breakfast  polyethylene glycol 3350 17 Gram(s) Oral daily  senna 2 Tablet(s) Oral at bedtime          REVIEW OF SYSTEMS    [x ] A ten-point review of systems was otherwise negative except as noted.  [ ] Due to altered mental status/intubation, subjective information were not able to be obtained from the patient. History was obtained, to the extent possible, from review of the chart and collateral sources of information.      Exam:  AAOX3. Verbal function intact  tongue midline, facial motions symmetric  PERRLA, EOMI  Pronator Drift:   Finger to Nose intact  Motor: MAEx4, 5/5 power in b/l UE and LE  Sensation: intact to touch in all extremities        CBC Full  -  ( 25 Oct 2021 04:30 )  WBC Count : 9.56 K/uL  RBC Count : 5.44 M/uL  Hemoglobin : 15.6 g/dL  Hematocrit : 47.1 %  Platelet Count - Automated : 245 K/uL  Mean Cell Volume : 86.6 fL  Mean Cell Hemoglobin : 28.7 pg  Mean Cell Hemoglobin Concentration : 33.1 g/dL  Auto Neutrophil # : 8.31 K/uL  Auto Lymphocyte # : 0.99 K/uL  Auto Monocyte # : 0.22 K/uL  Auto Eosinophil # : 0.00 K/uL  Auto Basophil # : 0.01 K/uL  Auto Neutrophil % : 86.9 %  Auto Lymphocyte % : 10.4 %  Auto Monocyte % : 2.3 %  Auto Eosinophil % : 0.0 %  Auto Basophil % : 0.1 %    10-25    136  |  99  |  17  ----------------------------<  126<H>  4.5   |  20  |  1.0    Ca    9.5      25 Oct 2021 04:30              Wound:  CDI, dressed with xeroform, Telfa, Tegaderm       Assessment/Plan:  This is a 41 year old gentleman, POD# 0 S/P Right L3-4 MLD      Pain control: Robaxin 750mg TID, PRN Tylenol, PRN Oxycodone   Advance diet as tolerated, GI ppx  DVT prophylaxis: b/l sequentials, ok for SQH 10.27   ID recs appreciated: Rocephin 2g q24h . Can d/c on Keflex 500mg q6 to complete 7-day course  PT rehab/ encourage incentive spirometry  Care per Medical Team   Neurosurgery Post Operative Note      POD# 0  S/P Right L4-5 MLD    Pt seen and examined at the bedside in PACU.  The pt was successfully extubated intraoperative.      Subjective: 41yMale with a pmhx of INTRACTABLE BACK PAIN    ^CALF PAIN    Handoff    MEWS Score    HANH (obstructive sleep apnea)    Thyroid disease    Kidney mass    Renal cell cancer    Back pain    Intractable back pain    Revision, laminectomy, lumbar    H/O bariatric surgery    CALF PAIN    90+    SysAdmin_VisitLink      POD# 0  S/P Right L4-5 MLD      Allergies    penicillin (Unknown)    Intolerances        Vital Signs Last 24 Hrs  T(C): 36.2 (26 Oct 2021 08:15), Max: 36.6 (25 Oct 2021 21:26)  T(F): 97.2 (26 Oct 2021 07:16), Max: 97.9 (25 Oct 2021 21:26)  HR: 71 (26 Oct 2021 08:15) (70 - 88)  BP: 127/65 (26 Oct 2021 08:15) (111/67 - 128/68)  BP(mean): 84 (26 Oct 2021 08:15) (84 - 92)  RR: 16 (26 Oct 2021 08:15) (16 - 18)  SpO2: 100% (26 Oct 2021 08:15) (100% - 100%)      acetaminophen     Tablet .. 1000 milliGRAM(s) Oral every 8 hours  cefTRIAXone   IVPB 2000 milliGRAM(s) IV Intermittent every 24 hours  chlorhexidine 0.12% Liquid 15 milliLiter(s) Oral Mucosa two times a day  dexAMETHasone  Injectable 6 milliGRAM(s) IV Push every 8 hours  HYDROmorphone  Injectable 0.5 milliGRAM(s) IV Push every 10 minutes PRN  influenza   Vaccine 0.5 milliLiter(s) IntraMuscular once  lactated ringers. 1000 milliLiter(s) IV Continuous <Continuous>  levothyroxine 100 MICROGram(s) Oral daily  lidocaine   4% Patch 1 Patch Transdermal daily  methocarbamol 750 milliGRAM(s) Oral three times a day  morphine  - Injectable 4 milliGRAM(s) IV Push every 6 hours PRN  oxyCODONE    IR 5 milliGRAM(s) Oral every 4 hours PRN  oxyCODONE    IR 10 milliGRAM(s) Oral every 4 hours PRN  pantoprazole    Tablet 40 milliGRAM(s) Oral before breakfast  polyethylene glycol 3350 17 Gram(s) Oral daily  senna 2 Tablet(s) Oral at bedtime          REVIEW OF SYSTEMS    [x ] A ten-point review of systems was otherwise negative except as noted.  [ ] Due to altered mental status/intubation, subjective information were not able to be obtained from the patient. History was obtained, to the extent possible, from review of the chart and collateral sources of information.      Exam:  AAOX3. Verbal function intact  tongue midline, facial motions symmetric  PERRLA, EOMI  Pronator Drift:   Finger to Nose intact  Motor: MAEx4, 5/5 power in b/l UE and LE  Sensation: intact to touch in all extremities        CBC Full  -  ( 25 Oct 2021 04:30 )  WBC Count : 9.56 K/uL  RBC Count : 5.44 M/uL  Hemoglobin : 15.6 g/dL  Hematocrit : 47.1 %  Platelet Count - Automated : 245 K/uL  Mean Cell Volume : 86.6 fL  Mean Cell Hemoglobin : 28.7 pg  Mean Cell Hemoglobin Concentration : 33.1 g/dL  Auto Neutrophil # : 8.31 K/uL  Auto Lymphocyte # : 0.99 K/uL  Auto Monocyte # : 0.22 K/uL  Auto Eosinophil # : 0.00 K/uL  Auto Basophil # : 0.01 K/uL  Auto Neutrophil % : 86.9 %  Auto Lymphocyte % : 10.4 %  Auto Monocyte % : 2.3 %  Auto Eosinophil % : 0.0 %  Auto Basophil % : 0.1 %    10-25    136  |  99  |  17  ----------------------------<  126<H>  4.5   |  20  |  1.0    Ca    9.5      25 Oct 2021 04:30              Wound:  CDI, closed with running locking 3-0 chromic  dressed with xeroform, Telfa, Tegaderm       Assessment/Plan:  This is a 41 year old gentleman, POD# 0 S/P Right L3-4 MLD      Pain control: Robaxin 750mg TID, PRN Tylenol, PRN Oxycodone   Advance diet as tolerated, GI ppx  DVT prophylaxis: b/l sequentials, ok for SQH 10.27   ID recs appreciated: Rocephin 2g q24h . Can d/c on Keflex 500mg q6 to complete 7-day course  PT rehab/ encourage incentive spirometry  Care per Medical Team   Neurosurgery Post Operative Note      POD# 0  S/P Right L4-5 MLD    EBL 50cc  IVF 800cc LR    Pt seen and examined at the bedside in PACU.  The pt was successfully extubated intraoperative.  At present time the pt is resting in bed, in NAD and hemodynamically stable.  Pt endorses residue paresthesia to the lateral R LE and sharp/pressure like incisional pain about the lumbar spine, non radiating.  Wound CDI, will treat post operative pain   PT denies: radicular pain, weakness, anaesthesia        Subjective: 41yMale with a pmhx of INTRACTABLE BACK PAIN    ^CALF PAIN    Handoff    MEWS Score    HANH (obstructive sleep apnea)    Thyroid disease    Kidney mass    Renal cell cancer    Back pain    Intractable back pain    Revision, laminectomy, lumbar    H/O bariatric surgery    CALF PAIN    90+    SysAdmin_VisitLink      POD# 0  S/P Right L4-5 MLD      Allergies    penicillin (Unknown)    Intolerances        Vital Signs Last 24 Hrs  T(C): 36.2 (26 Oct 2021 08:15), Max: 36.6 (25 Oct 2021 21:26)  T(F): 97.2 (26 Oct 2021 07:16), Max: 97.9 (25 Oct 2021 21:26)  HR: 71 (26 Oct 2021 08:15) (70 - 88)  BP: 127/65 (26 Oct 2021 08:15) (111/67 - 128/68)  BP(mean): 84 (26 Oct 2021 08:15) (84 - 92)  RR: 16 (26 Oct 2021 08:15) (16 - 18)  SpO2: 100% (26 Oct 2021 08:15) (100% - 100%)      acetaminophen     Tablet .. 1000 milliGRAM(s) Oral every 8 hours  cefTRIAXone   IVPB 2000 milliGRAM(s) IV Intermittent every 24 hours  chlorhexidine 0.12% Liquid 15 milliLiter(s) Oral Mucosa two times a day  dexAMETHasone  Injectable 6 milliGRAM(s) IV Push every 8 hours  HYDROmorphone  Injectable 0.5 milliGRAM(s) IV Push every 10 minutes PRN  influenza   Vaccine 0.5 milliLiter(s) IntraMuscular once  lactated ringers. 1000 milliLiter(s) IV Continuous <Continuous>  levothyroxine 100 MICROGram(s) Oral daily  lidocaine   4% Patch 1 Patch Transdermal daily  methocarbamol 750 milliGRAM(s) Oral three times a day  morphine  - Injectable 4 milliGRAM(s) IV Push every 6 hours PRN  oxyCODONE    IR 5 milliGRAM(s) Oral every 4 hours PRN  oxyCODONE    IR 10 milliGRAM(s) Oral every 4 hours PRN  pantoprazole    Tablet 40 milliGRAM(s) Oral before breakfast  polyethylene glycol 3350 17 Gram(s) Oral daily  senna 2 Tablet(s) Oral at bedtime          REVIEW OF SYSTEMS    [x ] A ten-point review of systems was otherwise negative except as noted.  [ ] Due to altered mental status/intubation, subjective information were not able to be obtained from the patient. History was obtained, to the extent possible, from review of the chart and collateral sources of information.      Exam:  A+O x 3, following all commands  Speech clear   EOMI, facial feature symmetrical  Motor:  5/5 b/l UE  5/5 b/l   RLE -4/5 pain limited  LLE 4/5  5/5 B/L plantar flexion/dorsi flexion  + Paraesthesia to the lateral R LE      CBC Full  -  ( 25 Oct 2021 04:30 )  WBC Count : 9.56 K/uL  RBC Count : 5.44 M/uL  Hemoglobin : 15.6 g/dL  Hematocrit : 47.1 %  Platelet Count - Automated : 245 K/uL  Mean Cell Volume : 86.6 fL  Mean Cell Hemoglobin : 28.7 pg  Mean Cell Hemoglobin Concentration : 33.1 g/dL  Auto Neutrophil # : 8.31 K/uL  Auto Lymphocyte # : 0.99 K/uL  Auto Monocyte # : 0.22 K/uL  Auto Eosinophil # : 0.00 K/uL  Auto Basophil # : 0.01 K/uL  Auto Neutrophil % : 86.9 %  Auto Lymphocyte % : 10.4 %  Auto Monocyte % : 2.3 %  Auto Eosinophil % : 0.0 %  Auto Basophil % : 0.1 %    10-25    136  |  99  |  17  ----------------------------<  126<H>  4.5   |  20  |  1.0    Ca    9.5      25 Oct 2021 04:30              Wound:  CDI, closed with running locking 3-0 chromic  dressed with xeroform, Telfa, Tegaderm       Assessment/Plan:  This is a 41 year old gentleman, POD# 0 S/P Right L3-4 MLD      Pain control: Robaxin 750mg TID, PRN Tylenol, PRN Oxycodone   Advance diet as tolerated, GI ppx  DVT prophylaxis: b/l evas, ok for SQH 10.27   ID recs appreciated: Rocephin 2g q24h . Can d/c on Keflex 500mg q6 to complete 7-day course  PT rehab/ encourage incentive spirometry  Care per Medical Team

## 2021-10-26 NOTE — PROGRESS NOTE ADULT - SUBJECTIVE AND OBJECTIVE BOX
Patient is a 41y old  Male who presents with a chief complaint of Back pain (26 Oct 2021 12:53)      Patient seen and examined at bedside.    ALLERGIES:  penicillin (Unknown)    MEDICATIONS:  acetaminophen     Tablet .. 1000 milliGRAM(s) Oral every 8 hours  cefTRIAXone   IVPB 2000 milliGRAM(s) IV Intermittent every 24 hours  chlorhexidine 0.12% Liquid 15 milliLiter(s) Oral Mucosa two times a day  dexAMETHasone  Injectable 6 milliGRAM(s) IV Push every 8 hours  heparin   Injectable 5000 Unit(s) SubCutaneous every 12 hours  influenza   Vaccine 0.5 milliLiter(s) IntraMuscular once  levothyroxine 100 MICROGram(s) Oral daily  lidocaine   4% Patch 1 Patch Transdermal daily  methocarbamol 750 milliGRAM(s) Oral three times a day  morphine  - Injectable 4 milliGRAM(s) IV Push every 6 hours PRN  oxyCODONE    IR 5 milliGRAM(s) Oral every 4 hours PRN  oxyCODONE    IR 10 milliGRAM(s) Oral every 4 hours PRN  pantoprazole    Tablet 40 milliGRAM(s) Oral before breakfast  polyethylene glycol 3350 17 Gram(s) Oral daily  senna 2 Tablet(s) Oral at bedtime    Vital Signs Last 24 Hrs  T(F): 98.2 (26 Oct 2021 14:00), Max: 98.2 (26 Oct 2021 14:00)  HR: 77 (26 Oct 2021 14:00) (70 - 113)  BP: 125/75 (26 Oct 2021 14:00) (111/67 - 140/82)  RR: 17 (26 Oct 2021 14:00) (10 - 18)  SpO2: 97% (26 Oct 2021 14:00) (94% - 100%)  I&O's Summary    26 Oct 2021 07:01  -  26 Oct 2021 15:54  --------------------------------------------------------  IN: 150 mL / OUT: 0 mL / NET: 150 mL        PHYSICAL EXAM:  General: NAD, A/O x 3  ENT: MMM  Neck: Supple, No JVD  Lungs: Clear to auscultation bilaterally  Cardio: RRR, S1/S2, No murmurs  Abdomen: Soft, Nontender, Nondistended; Bowel sounds present  Extremities: No cyanosis, No edema    LABS:                        15.6   9.56  )-----------( 245      ( 25 Oct 2021 04:30 )             47.1     10-25    136  |  99  |  17  ----------------------------<  126  4.5   |  20  |  1.0    Ca    9.5      25 Oct 2021 04:30      eGFR if Non : 93 mL/min/1.73M2 (10-25-21 @ 04:30)  eGFR if African American: 108 mL/min/1.73M2 (10-25-21 @ 04:30)                                  COVID-19 PCR: NotDetec (10-22-21 @ 15:00)      RADIOLOGY & ADDITIONAL TESTS:    Care Discussed with Consultants/Other Providers:

## 2021-10-26 NOTE — CHART NOTE - NSCHARTNOTEFT_GEN_A_CORE
Patient states that he is in pain. Will start him on pain regimen. Received decadron 10mg x1 in [facility] ED. Will f/u with MRI spine per NS. c/w Robaxin 750mg TID. States that his urinary symptoms resolved, is voiding normally now.
PACU ANESTHESIA ADMISSION NOTE      Procedure: Revision, laminectomy, lumbar      Post op diagnosis:      ____  Intubated  TV:______       Rate: ______      FiO2: ______    __x__  Patent Airway    __x__  Full return of protective reflexes    __x__  Full recovery from anesthesia / back to baseline     Vitals:   T: 97.9          R:  14                BP:  140/94                Sat: 99                  P: 112      Mental Status:  _x___ Awake   ___x__ Alert   _____ Drowsy   _____ Sedated    Nausea/Vomiting:  ___x_ NO  ______Yes,   See Post - Op Orders          Pain Scale (0-10):  _2____    Treatment: ____ None    _x___ See Post - Op/PCA Orders    Post - Operative Fluids:   ____ Oral   __x__ See Post - Op Orders    Plan: Discharge:   ____Home       __x___Floor     _____Critical Care    _____  Other:_________________    Comments: tolerated procedure well
patient seen at bedside     patient here for acute on chronic back pain mostly right sided.    Plan  1) MR is pending  2) continue with pain management  3) start on bowel regime  4) neurosurgery to see after MR

## 2021-10-26 NOTE — PROGRESS NOTE ADULT - ASSESSMENT
Pt is a 40yo M w/ PMH of R sciatica, and renal mass s/p partial nephrectomy at [facility] 8/21, and hypothyroidism presented to the ED w/ R. back pain since his nephrectomy, but worsened recently (~2wk ago). During hospital admission, pt has begun to complain of throat pain, mild difficulty swallowing and an episode of hemoptysis. Pt's MRI showed 1.3 cm right paracentral disc extrusion at L4-5 effacing the right lateral recess with posterior displacement of the descending L5 nerve roots, resulting in moderate spinal stenosis and moderate right worse than left foraminal stenosis. CT neck also demonstrated generalized thickening with apparent laceration of the inferior aspect of the uvula possibly representing uvulitis.     #Mod-spinal stenosis w/ foraminal stenosis  -MRI lumbar demonstrated moderate spinal stenosis of L4-L5 w/ mod foraminal stenosis; Lumbar XR neg  -Currently on Robaxin 750mg po TID  -Oxycodone and Morphine PRN  -neurosurg consult appreciated; undergoing L4-L5 Microlumbar diskectomy today  -was made NPO at [facility]    #Uvulitis with hemoptysis  -ENT c/s appreciated;   - CT neck demonstrated possible uvulitis; no fluid collection or LAD  -d/c'd Dexamethasone 6mg IVP q8  -ID consult appreciated; Rocephin 2g IV qd; on d/c de-escalate to keflex 500mg q6 - to complete total 7d abx course. (end date 10/31)    #Hypothyroidism  -Levothyroxine 100mcg qd    #Misc  DVT PPx: LVX  Diet - NPO [facility]  GI PPx; Pantoprazole

## 2021-10-26 NOTE — PRE-ANESTHESIA EVALUATION ADULT - MALLAMPATI CLASS
thick neck/Class III - visualization of the soft palate and the base of the uvula
Class III - visualization of the soft palate and the base of the uvula

## 2021-10-27 ENCOUNTER — TRANSCRIPTION ENCOUNTER (OUTPATIENT)
Age: 41
End: 2021-10-27

## 2021-10-27 VITALS
DIASTOLIC BLOOD PRESSURE: 72 MMHG | SYSTOLIC BLOOD PRESSURE: 150 MMHG | HEART RATE: 73 BPM | RESPIRATION RATE: 20 BRPM | TEMPERATURE: 97 F

## 2021-10-27 LAB
ALBUMIN SERPL ELPH-MCNC: 4.3 G/DL — SIGNIFICANT CHANGE UP (ref 3.5–5.2)
ALP SERPL-CCNC: 59 U/L — SIGNIFICANT CHANGE UP (ref 30–115)
ALT FLD-CCNC: 20 U/L — SIGNIFICANT CHANGE UP (ref 0–41)
ANION GAP SERPL CALC-SCNC: 13 MMOL/L — SIGNIFICANT CHANGE UP (ref 7–14)
AST SERPL-CCNC: 18 U/L — SIGNIFICANT CHANGE UP (ref 0–41)
BASOPHILS # BLD AUTO: 0.01 K/UL — SIGNIFICANT CHANGE UP (ref 0–0.2)
BASOPHILS NFR BLD AUTO: 0.1 % — SIGNIFICANT CHANGE UP (ref 0–1)
BILIRUB SERPL-MCNC: 0.6 MG/DL — SIGNIFICANT CHANGE UP (ref 0.2–1.2)
BUN SERPL-MCNC: 21 MG/DL — HIGH (ref 10–20)
CALCIUM SERPL-MCNC: 9.4 MG/DL — SIGNIFICANT CHANGE UP (ref 8.5–10.1)
CHLORIDE SERPL-SCNC: 99 MMOL/L — SIGNIFICANT CHANGE UP (ref 98–110)
CO2 SERPL-SCNC: 24 MMOL/L — SIGNIFICANT CHANGE UP (ref 17–32)
CREAT SERPL-MCNC: 1.1 MG/DL — SIGNIFICANT CHANGE UP (ref 0.7–1.5)
EOSINOPHIL # BLD AUTO: 0 K/UL — SIGNIFICANT CHANGE UP (ref 0–0.7)
EOSINOPHIL NFR BLD AUTO: 0 % — SIGNIFICANT CHANGE UP (ref 0–8)
GLUCOSE SERPL-MCNC: 125 MG/DL — HIGH (ref 70–99)
HCT VFR BLD CALC: 49.8 % — SIGNIFICANT CHANGE UP (ref 42–52)
HGB BLD-MCNC: 16.4 G/DL — SIGNIFICANT CHANGE UP (ref 14–18)
IMM GRANULOCYTES NFR BLD AUTO: 0.4 % — HIGH (ref 0.1–0.3)
LYMPHOCYTES # BLD AUTO: 0.92 K/UL — LOW (ref 1.2–3.4)
LYMPHOCYTES # BLD AUTO: 5.6 % — LOW (ref 20.5–51.1)
MCHC RBC-ENTMCNC: 28.7 PG — SIGNIFICANT CHANGE UP (ref 27–31)
MCHC RBC-ENTMCNC: 32.9 G/DL — SIGNIFICANT CHANGE UP (ref 32–37)
MCV RBC AUTO: 87.2 FL — SIGNIFICANT CHANGE UP (ref 80–94)
MONOCYTES # BLD AUTO: 1.07 K/UL — HIGH (ref 0.1–0.6)
MONOCYTES NFR BLD AUTO: 6.5 % — SIGNIFICANT CHANGE UP (ref 1.7–9.3)
NEUTROPHILS # BLD AUTO: 14.3 K/UL — HIGH (ref 1.4–6.5)
NEUTROPHILS NFR BLD AUTO: 87.4 % — HIGH (ref 42.2–75.2)
NRBC # BLD: 0 /100 WBCS — SIGNIFICANT CHANGE UP (ref 0–0)
PLATELET # BLD AUTO: 262 K/UL — SIGNIFICANT CHANGE UP (ref 130–400)
POTASSIUM SERPL-MCNC: 5.3 MMOL/L — HIGH (ref 3.5–5)
POTASSIUM SERPL-SCNC: 5.3 MMOL/L — HIGH (ref 3.5–5)
PROT SERPL-MCNC: 6.6 G/DL — SIGNIFICANT CHANGE UP (ref 6–8)
RBC # BLD: 5.71 M/UL — SIGNIFICANT CHANGE UP (ref 4.7–6.1)
RBC # FLD: 14.4 % — SIGNIFICANT CHANGE UP (ref 11.5–14.5)
SODIUM SERPL-SCNC: 136 MMOL/L — SIGNIFICANT CHANGE UP (ref 135–146)
WBC # BLD: 16.37 K/UL — HIGH (ref 4.8–10.8)
WBC # FLD AUTO: 16.37 K/UL — HIGH (ref 4.8–10.8)

## 2021-10-27 RX ORDER — METHOCARBAMOL 500 MG/1
1 TABLET, FILM COATED ORAL
Qty: 56 | Refills: 0
Start: 2021-10-27 | End: 2021-11-09

## 2021-10-27 RX ORDER — MORPHINE SULFATE 50 MG/1
2 CAPSULE, EXTENDED RELEASE ORAL ONCE
Refills: 0 | Status: DISCONTINUED | OUTPATIENT
Start: 2021-10-27 | End: 2021-10-27

## 2021-10-27 RX ORDER — HEPARIN SODIUM 5000 [USP'U]/ML
5000 INJECTION INTRAVENOUS; SUBCUTANEOUS EVERY 8 HOURS
Refills: 0 | Status: DISCONTINUED | OUTPATIENT
Start: 2021-10-27 | End: 2021-10-27

## 2021-10-27 RX ORDER — CEPHALEXIN 500 MG
1 CAPSULE ORAL
Qty: 16 | Refills: 0
Start: 2021-10-27 | End: 2021-10-30

## 2021-10-27 RX ADMIN — PANTOPRAZOLE SODIUM 40 MILLIGRAM(S): 20 TABLET, DELAYED RELEASE ORAL at 08:57

## 2021-10-27 RX ADMIN — Medication 100 MICROGRAM(S): at 05:58

## 2021-10-27 RX ADMIN — Medication 1000 MILLIGRAM(S): at 05:56

## 2021-10-27 RX ADMIN — OXYCODONE HYDROCHLORIDE 10 MILLIGRAM(S): 5 TABLET ORAL at 13:30

## 2021-10-27 RX ADMIN — HEPARIN SODIUM 5000 UNIT(S): 5000 INJECTION INTRAVENOUS; SUBCUTANEOUS at 13:25

## 2021-10-27 RX ADMIN — CHLORHEXIDINE GLUCONATE 15 MILLILITER(S): 213 SOLUTION TOPICAL at 17:05

## 2021-10-27 RX ADMIN — METHOCARBAMOL 750 MILLIGRAM(S): 500 TABLET, FILM COATED ORAL at 13:25

## 2021-10-27 RX ADMIN — OXYCODONE HYDROCHLORIDE 10 MILLIGRAM(S): 5 TABLET ORAL at 17:44

## 2021-10-27 RX ADMIN — Medication 6 MILLIGRAM(S): at 05:57

## 2021-10-27 RX ADMIN — OXYCODONE HYDROCHLORIDE 10 MILLIGRAM(S): 5 TABLET ORAL at 01:49

## 2021-10-27 RX ADMIN — CHLORHEXIDINE GLUCONATE 15 MILLILITER(S): 213 SOLUTION TOPICAL at 05:57

## 2021-10-27 RX ADMIN — HEPARIN SODIUM 5000 UNIT(S): 5000 INJECTION INTRAVENOUS; SUBCUTANEOUS at 05:59

## 2021-10-27 RX ADMIN — Medication 1000 MILLIGRAM(S): at 07:36

## 2021-10-27 RX ADMIN — MORPHINE SULFATE 4 MILLIGRAM(S): 50 CAPSULE, EXTENDED RELEASE ORAL at 06:15

## 2021-10-27 RX ADMIN — OXYCODONE HYDROCHLORIDE 10 MILLIGRAM(S): 5 TABLET ORAL at 00:35

## 2021-10-27 RX ADMIN — Medication 1000 MILLIGRAM(S): at 13:24

## 2021-10-27 RX ADMIN — METHOCARBAMOL 750 MILLIGRAM(S): 500 TABLET, FILM COATED ORAL at 05:58

## 2021-10-27 RX ADMIN — OXYCODONE HYDROCHLORIDE 10 MILLIGRAM(S): 5 TABLET ORAL at 09:10

## 2021-10-27 RX ADMIN — MORPHINE SULFATE 4 MILLIGRAM(S): 50 CAPSULE, EXTENDED RELEASE ORAL at 06:00

## 2021-10-27 RX ADMIN — Medication 6 MILLIGRAM(S): at 13:30

## 2021-10-27 RX ADMIN — CEFTRIAXONE 100 MILLIGRAM(S): 500 INJECTION, POWDER, FOR SOLUTION INTRAMUSCULAR; INTRAVENOUS at 17:01

## 2021-10-27 NOTE — PROGRESS NOTE ADULT - SUBJECTIVE AND OBJECTIVE BOX
Pt is a 42 yo M who presented w/ LBP in setting of moderate lumbar stenosis with moderate foraminal stenosis, and developed uvulitis during hospital stay.     INTERVAL HPI/OVERNIGHT EVENTS:  Pt was seen at bedside this morning, doing well. Back pain is improving. Urinating ok. Able to tolerate po.       REVIEW OF SYSTEMS:  CONSTITUTIONAL: No fever, weight loss, or fatigue  EYES: No eye pain, visual disturbances, or discharge  ENMT:  No difficulty hearing, tinnitus, vertigo; No sinus or throat pain  NECK: No pain or stiffness  BREASTS: No pain, masses, or nipple discharge  RESPIRATORY: No cough, wheezing, chills or hemoptysis; No shortness of breath  CARDIOVASCULAR: No chest pain, palpitations, dizziness, or leg swelling  GASTROINTESTINAL: No abdominal or epigastric pain. No nausea, vomiting, or hematemesis; No diarrhea or constipation. No melena or hematochezia.  GENITOURINARY: No dysuria, frequency, hematuria, or incontinence  NEUROLOGICAL: No headaches, memory loss, loss of strength, numbness, or tremors  SKIN: No itching, burning, rashes, or lesions   LYMPH NODES: No enlarged glands  ENDOCRINE: No heat or cold intolerance; No hair loss  MUSCULOSKELETAL: No joint pain or swelling; No muscle, back, or extremity pain  PSYCHIATRIC: No depression, anxiety, mood swings, or difficulty sleeping  HEME/LYMPH: No easy bruising, or bleeding gums  ALLERY AND IMMUNOLOGIC: No hives or eczema  FAMILY HISTORY:    T(C): 36.3 (10-27-21 @ 05:51), Max: 36.8 (10-26-21 @ 14:00)  HR: 69 (10-27-21 @ 05:51) (69 - 113)  BP: 118/89 (10-27-21 @ 05:51) (118/89 - 140/82)  RR: 16 (10-27-21 @ 05:51) (10 - 19)  SpO2: 95% (10-27-21 @ 05:51) (94% - 98%)  Wt(kg): --Vital Signs Last 24 Hrs  T(C): 36.3 (27 Oct 2021 05:51), Max: 36.8 (26 Oct 2021 14:00)  T(F): 97.4 (27 Oct 2021 05:51), Max: 98.2 (26 Oct 2021 14:00)  HR: 69 (27 Oct 2021 05:51) (69 - 113)  BP: 118/89 (27 Oct 2021 05:51) (118/89 - 140/82)  BP(mean): 105 (26 Oct 2021 12:15) (105 - 105)  RR: 16 (27 Oct 2021 05:51) (10 - 19)  SpO2: 95% (27 Oct 2021 05:51) (94% - 98%)  penicillin (Unknown)      PHYSICAL EXAM:  GENERAL: NAD, well-groomed, well-developed  HEAD:  Atraumatic, Normocephalic  EYES: EOMI, PERRLA, conjunctiva and sclera clear  ENMT: No tonsillar erythema, exudates, or enlargement; Moist mucous membranes, Good dentition, No lesions  NECK: Supple, No JVD, Normal thyroid  NERVOUS SYSTEM:  Alert & Oriented X3, Good concentration; Motor Strength 5/5 B/L upper and lower extremities; DTRs 2+ intact and symmetric  CHEST/LUNG: Clear to percussion bilaterally; No rales, rhonchi, wheezing, or rubs  HEART: Regular rate and rhythm; No murmurs, rubs, or gallops  ABDOMEN: Soft, Nontender, Nondistended; Bowel sounds present  EXTREMITIES:  2+ Peripheral Pulses, No clubbing, cyanosis, or edema  LYMPH: No lymphadenopathy noted  SKIN: No rashes or lesions    Consultant(s) Notes Reviewed:  [x ] YES  [ ] NO  Care Discussed with Consultants/Other Providers [ x] YES  [ ] NO    LABS:                        16.4   16.37 )-----------( 262      ( 27 Oct 2021 07:19 )             49.8       10-27    136  |  99  |  21<H>  ----------------------------<  125<H>  5.3<H>   |  24  |  1.1    Ca    9.4      27 Oct 2021 07:19    TPro  6.6  /  Alb  4.3  /  TBili  0.6  /  DBili  x   /  AST  18  /  ALT  20  /  AlkPhos  59  10-27            RADIOLOGY & ADDITIONAL TESTS:  CT Neck Soft Tissue w/ IV Cont (10.24.21 @ 19:11)  IMPRESSION:  Generalized thickening with apparent laceration of the inferior aspect of the uvula possibly representing uvulitis given clinical history. No abnormal fluid collection or lymphadenopathy.      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 There are 4 rib-free lumbar type vertebrae using this counting method.    1.3 cm right paracentral disc extrusion at L4-5 effacing the right lateral recess with posterior displacement of the descending L5 nerve roots. This results in moderate spinal stenosis and moderate right worse than left foraminal stenosis      Xray Lumbar Spine AP + Lateral (10.22.21 @ 18:18) >  FINDINGS: There is a transitional vertebra with sacralization of L5.  There is normal-appearing alignment of the vertebral bodies.  Vertebral body heights appear normal.  No acute fracture or dislocation is seen.  No definite disc space narrowing is seen.  The remainder of visualized bones are grossly unremarkable.  There is a non-obstructive bowel gas pattern.  No abnormal masses or calcifications are seen.    IMPRESSION: No acute fracture or dislocation is seen.          Imaging Personally Reviewed:  [ ] YES  [ ] NO  acetaminophen     Tablet .. 1000 milliGRAM(s) Oral every 8 hours  cefTRIAXone   IVPB 2000 milliGRAM(s) IV Intermittent every 24 hours  chlorhexidine 0.12% Liquid 15 milliLiter(s) Oral Mucosa two times a day  dexAMETHasone  Injectable 6 milliGRAM(s) IV Push every 8 hours  heparin   Injectable 5000 Unit(s) SubCutaneous every 8 hours  influenza   Vaccine 0.5 milliLiter(s) IntraMuscular once  levothyroxine 100 MICROGram(s) Oral daily  lidocaine   4% Patch 1 Patch Transdermal daily  methocarbamol 750 milliGRAM(s) Oral three times a day  oxyCODONE    IR 5 milliGRAM(s) Oral every 4 hours PRN  oxyCODONE    IR 10 milliGRAM(s) Oral every 4 hours PRN  pantoprazole    Tablet 40 milliGRAM(s) Oral before breakfast  polyethylene glycol 3350 17 Gram(s) Oral daily  senna 2 Tablet(s) Oral at bedtime      HEALTH ISSUES - PROBLEM Dx:         Pt is a 40 yo M who presented w/ LBP in setting of moderate lumbar stenosis with moderate foraminal stenosis, and developed uvulitis during hospital stay.     INTERVAL HPI/OVERNIGHT EVENTS:  Pt was seen at bedside this morning, doing well. Back pain is improving. Urinating ok. Able to tolerate po. Uvulitis sx improving.       REVIEW OF SYSTEMS:  ENMT:  Throat pain improved. No difficulty hearing, tinnitus, vertigo; No sinus.  RESPIRATORY: No cough, wheezing, chills or recent hemoptysis; No shortness of breath  CARDIOVASCULAR: No chest pain, palpitations, dizziness, or leg swelling  GENITOURINARY: No dysuria, frequency, hematuria, or incontinence  NEUROLOGICAL: No headaches, memory loss, or tremors        Vitals  T(C): 36.3 (10-27-21 @ 05:51), Max: 36.8 (10-26-21 @ 14:00)  HR: 69 (10-27-21 @ 05:51) (69 - 113)  BP: 118/89 (10-27-21 @ 05:51) (118/89 - 140/82)  RR: 16 (10-27-21 @ 05:51) (10 - 19)  SpO2: 95% (10-27-21 @ 05:51) (94% - 98%)  Wt(kg): --Vital Signs Last 24 Hrs  T(C): 36.3 (27 Oct 2021 05:51), Max: 36.8 (26 Oct 2021 14:00)  T(F): 97.4 (27 Oct 2021 05:51), Max: 98.2 (26 Oct 2021 14:00)  HR: 69 (27 Oct 2021 05:51) (69 - 113)  BP: 118/89 (27 Oct 2021 05:51) (118/89 - 140/82)  BP(mean): 105 (26 Oct 2021 12:15) (105 - 105)  RR: 16 (27 Oct 2021 05:51) (10 - 19)  SpO2: 95% (27 Oct 2021 05:51) (94% - 98%)  penicillin (Unknown)      PHYSICAL EXAM:  GENERAL: NAD, well-groomed, well-developed  HEAD:  Atraumatic  ENMT: No tonsillar erythema, exudates, or enlargement; Moist mucous membranes, Good dentition, No lesions  NERVOUS SYSTEM:  Alert & Oriented X3, Good concentration; Motor Strength 5/5 B/L upper and lower extremities;  EXTREMITIES:  2+ Peripheral Pulses, No clubbing, cyanosis, or edema        LABS:                        16.4   16.37 )-----------( 262      ( 27 Oct 2021 07:19 )             49.8       10-27    136  |  99  |  21<H>  ----------------------------<  125<H>  5.3<H>   |  24  |  1.1    Ca    9.4      27 Oct 2021 07:19    TPro  6.6  /  Alb  4.3  /  TBili  0.6  /  DBili  x   /  AST  18  /  ALT  20  /  AlkPhos  59  10-27            RADIOLOGY & ADDITIONAL TESTS:  CT Neck Soft Tissue w/ IV Cont (10.24.21 @ 19:11)  IMPRESSION:  Generalized thickening with apparent laceration of the inferior aspect of the uvula possibly representing uvulitis given clinical history. No abnormal fluid collection or lymphadenopathy.      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 There are 4 rib-free lumbar type vertebrae using this counting method.    1.3 cm right paracentral disc extrusion at L4-5 effacing the right lateral recess with posterior displacement of the descending L5 nerve roots. This results in moderate spinal stenosis and moderate right worse than left foraminal stenosis      Xray Lumbar Spine AP + Lateral (10.22.21 @ 18:18) >  FINDINGS: There is a transitional vertebra with sacralization of L5.  There is normal-appearing alignment of the vertebral bodies.  Vertebral body heights appear normal.  No acute fracture or dislocation is seen.  No definite disc space narrowing is seen.  The remainder of visualized bones are grossly unremarkable.  There is a non-obstructive bowel gas pattern.  No abnormal masses or calcifications are seen.    IMPRESSION: No acute fracture or dislocation is seen.          Imaging Personally Reviewed:  [ ] YES  [ ] NO  acetaminophen     Tablet .. 1000 milliGRAM(s) Oral every 8 hours  cefTRIAXone   IVPB 2000 milliGRAM(s) IV Intermittent every 24 hours  chlorhexidine 0.12% Liquid 15 milliLiter(s) Oral Mucosa two times a day  dexAMETHasone  Injectable 6 milliGRAM(s) IV Push every 8 hours  heparin   Injectable 5000 Unit(s) SubCutaneous every 8 hours  influenza   Vaccine 0.5 milliLiter(s) IntraMuscular once  levothyroxine 100 MICROGram(s) Oral daily  lidocaine   4% Patch 1 Patch Transdermal daily  methocarbamol 750 milliGRAM(s) Oral three times a day  oxyCODONE    IR 5 milliGRAM(s) Oral every 4 hours PRN  oxyCODONE    IR 10 milliGRAM(s) Oral every 4 hours PRN  pantoprazole    Tablet 40 milliGRAM(s) Oral before breakfast  polyethylene glycol 3350 17 Gram(s) Oral daily  senna 2 Tablet(s) Oral at bedtime

## 2021-10-27 NOTE — DISCHARGE NOTE PROVIDER - HOSPITAL COURSE
This is a 42 YO M with PMHx of R sciatica, renal mass s/p nephrectomy at [facility] in July 2021, hypothyroidism presented to the ED with right back pain.   He states that 3 days after the surgery he started noticing worsening R back pain with radiation to his R leg. At that time walking around would alleviate the pain. Almost 2 weeks ago, the pain got worse. Denies saddle anesthesia and trouble with bowel movement.  Patient had MRI Lumbar spine:  < from: MR Lumbar Spine No Cont (10.23.21 @ 15:38) >  IMPRESSION:  *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 There are 4 rib-free lumbar type vertebrae using this counting method.  1.3 cm right paracentral disc extrusion at L4-5 effacing the right lateral recess with posterior displacement of the descending L5 nerve roots. This results in moderate spinal stenosis and moderate right worse than left foraminal stenosis  < end of copied text >  Patient subsequently underwent a L4-L5 microdiscectomy on    This is a 42 YO M with PMHx of R sciatica, renal mass s/p nephrectomy at [facility] in July 2021, hypothyroidism presented to the ED with right back pain.   He states that 3 days after the surgery he started noticing worsening R back pain with radiation to his R leg. At that time walking around would alleviate the pain. Almost 2 weeks ago, the pain got worse. Denies saddle anesthesia and trouble with bowel movement.  Patient had MRI Lumbar spine:  < from: MR Lumbar Spine No Cont (10.23.21 @ 15:38) >  IMPRESSION:  *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 There are 4 rib-free lumbar type vertebrae using this counting method.  1.3 cm right paracentral disc extrusion at L4-5 effacing the right lateral recess with posterior displacement of the descending L5 nerve roots. This results in moderate spinal stenosis and moderate right worse than left foraminal stenosis  < end of copied text >  Patient subsequently underwent a L4-L5 microdiscectomy on 10.26.21.  Patient did well postop with physical therapy.  Pt stated he had relief of his preop symptoms of RLE pain and b/l foot numbness.  Patient also diagnosed with uvulitis while admitted.  Started on a 7 day course of antibiotics.  Pt carries history of unknown reaction to PCN however tolerated Ceftriaxone x 3 days while admitted.  Hospital pharmacist stated pt could be discharged on PO Keflex x 4 days to complete course.  Pt to follow up with ENT as outpt.  PT cleared pt for d/c with rolling walker.   Dr Lima aware of above.

## 2021-10-27 NOTE — PROGRESS NOTE ADULT - SUBJECTIVE AND OBJECTIVE BOX
Subjective: 41yMale with a pmhx of INTRACTABLE BACK PAIN    ^CALF PAIN    Handoff    MEWS Score    HANH (obstructive sleep apnea)    Thyroid disease    Kidney mass    Renal cell cancer    Back pain    Intractable back pain    Revision, laminectomy, lumbar    H/O bariatric surgery    CALF PAIN    90+    SysAdmin_VisitLink      POD#1  S/P Right L4-5 MLD      Pt seen and examined at the bedside.  Pt states the pain in his RLE has resolved and he states the numbness to toes b/l has also improved.   Pt has not gotten out of bed yet today.  Anticipating PT to see him.    Allergies    penicillin (Unknown)    Intolerances          Vital Signs Last 24 Hrs  T(C): 36.3 (27 Oct 2021 05:51), Max: 36.8 (26 Oct 2021 14:00)  T(F): 97.4 (27 Oct 2021 05:51), Max: 98.2 (26 Oct 2021 14:00)  HR: 69 (27 Oct 2021 05:51) (69 - 113)  BP: 118/89 (27 Oct 2021 05:51) (118/89 - 140/82)  BP(mean): 105 (26 Oct 2021 12:15) (105 - 105)  RR: 16 (27 Oct 2021 05:51) (10 - 19)  SpO2: 95% (27 Oct 2021 05:51) (94% - 98%)      acetaminophen     Tablet .. 1000 milliGRAM(s) Oral every 8 hours  cefTRIAXone   IVPB 2000 milliGRAM(s) IV Intermittent every 24 hours  chlorhexidine 0.12% Liquid 15 milliLiter(s) Oral Mucosa two times a day  dexAMETHasone  Injectable 6 milliGRAM(s) IV Push every 8 hours  heparin   Injectable 5000 Unit(s) SubCutaneous every 8 hours  influenza   Vaccine 0.5 milliLiter(s) IntraMuscular once  levothyroxine 100 MICROGram(s) Oral daily  lidocaine   4% Patch 1 Patch Transdermal daily  methocarbamol 750 milliGRAM(s) Oral three times a day  oxyCODONE    IR 10 milliGRAM(s) Oral every 4 hours PRN  oxyCODONE    IR 5 milliGRAM(s) Oral every 4 hours PRN  pantoprazole    Tablet 40 milliGRAM(s) Oral before breakfast  polyethylene glycol 3350 17 Gram(s) Oral daily  senna 2 Tablet(s) Oral at bedtime        10-26-21 @ 07:01  -  10-27-21 @ 07:00  --------------------------------------------------------  IN: 150 mL / OUT: 0 mL / NET: 150 mL    10-27-21 @ 07:01  -  10-27-21 @ 09:13  --------------------------------------------------------  IN: 0 mL / OUT: 500 mL / NET: -500 mL        REVIEW OF SYSTEMS    [x ] A ten-point review of systems was otherwise negative except as noted.  [ ] Due to altered mental status/intubation, subjective information were not able to be obtained from the patient. History was obtained, to the extent possible, from review of the chart and collateral sources of information.      Neuro Exam:  AAOX3. Verbal function intact  follows commands  PERRL  Motor: MAEx4  5/5 power in b/l LE   5/5 DF/PF  Sensation: intact to FT b/l, equal      Wound: incision covered, no drainage, no erythema      CBC Full  -  ( 27 Oct 2021 07:19 )  WBC Count : 16.37 K/uL  RBC Count : 5.71 M/uL  Hemoglobin : 16.4 g/dL  Hematocrit : 49.8 %  Platelet Count - Automated : 262 K/uL  Mean Cell Volume : 87.2 fL  Mean Cell Hemoglobin : 28.7 pg  Mean Cell Hemoglobin Concentration : 32.9 g/dL  Auto Neutrophil # : 14.30 K/uL  Auto Lymphocyte # : 0.92 K/uL  Auto Monocyte # : 1.07 K/uL  Auto Eosinophil # : 0.00 K/uL  Auto Basophil # : 0.01 K/uL  Auto Neutrophil % : 87.4 %  Auto Lymphocyte % : 5.6 %  Auto Monocyte % : 6.5 %  Auto Eosinophil % : 0.0 %  Auto Basophil % : 0.1 %    10-27    136  |  99  |  21<H>  ----------------------------<  125<H>  5.3<H>   |  24  |  1.1    Ca    9.4      27 Oct 2021 07:19    TPro  6.6  /  Alb  4.3  /  TBili  0.6  /  DBili  x   /  AST  18  /  ALT  20  /  AlkPhos  59  10-27        I&O's Detail    26 Oct 2021 07:01  -  27 Oct 2021 07:00  --------------------------------------------------------  IN:    Lactated Ringers: 150 mL  Total IN: 150 mL    OUT:  Total OUT: 0 mL    Total NET: 150 mL      27 Oct 2021 07:01  -  27 Oct 2021 09:13  --------------------------------------------------------  IN:  Total IN: 0 mL    OUT:    Voided (mL): 500 mL  Total OUT: 500 mL    Total NET: -500 mL        I&O's Summary    26 Oct 2021 07:01  -  27 Oct 2021 07:00  --------------------------------------------------------  IN: 150 mL / OUT: 0 mL / NET: 150 mL    27 Oct 2021 07:01  -  27 Oct 2021 09:13  --------------------------------------------------------  IN: 0 mL / OUT: 500 mL / NET: -500 mL          Assessment/Plan:   start SQ Heparin  pain meds  PT/rehab  Dx: uvulitis on this admission  ID recs appreciated: Rocephin 2g q24h . Can d/c on Keflex 500mg q6 to complete 7-day course  d/w attg

## 2021-10-27 NOTE — DISCHARGE NOTE NURSING/CASE MANAGEMENT/SOCIAL WORK - PATIENT PORTAL LINK FT
You can access the FollowMyHealth Patient Portal offered by [facility] by registering at the following website: [email or web address]. By joining [employer]’s FollowMyHealth portal, you will also be able to view your health information using other applications (apps) compatible with our system.

## 2021-10-27 NOTE — DISCHARGE NOTE NURSING/CASE MANAGEMENT/SOCIAL WORK - NSDCPEFALRISK_GEN_ALL_CORE
The patient is a 11m3w Male complaining of For information on Fall & injury Prevention, visit [email or web address]

## 2021-10-27 NOTE — DISCHARGE NOTE PROVIDER - NSDCCPCAREPLAN_GEN_ALL_CORE_FT
PRINCIPAL DISCHARGE DIAGNOSIS  Diagnosis: Intractable back pain  Assessment and Plan of Treatment:       SECONDARY DISCHARGE DIAGNOSES  Diagnosis: Lumbar disc herniation  Assessment and Plan of Treatment:

## 2021-10-27 NOTE — DISCHARGE NOTE PROVIDER - CARE PROVIDER_API CALL
Seda Lima)  Neurosurgery  [address], [address]  [place] [address]  Phone: [phone]  Fax: [phone]  Follow Up Time:     Cricket Ruiz)  Otolaryngology  [address] [place] [address]  Phone: [phone]  Fax: [phone]  Follow Up Time:

## 2021-10-27 NOTE — PHYSICAL THERAPY INITIAL EVALUATION ADULT - PERTINENT HX OF CURRENT PROBLEM, REHAB EVAL
40 YO M with PMHx of R sciatica, renal mass s/p nephrectomy at [facility] in July 2021, hypothyroidism presented to the ED with right back pain. negative...

## 2021-10-27 NOTE — DISCHARGE NOTE PROVIDER - NSDCFUADDINST_GEN_ALL_CORE_FT
- Upon discharge,  please call to schedule a follow up with Dr. Lima in 1-2 weeks.  - Upon discharge, please call to make a follow up appointment with your primary care provider to discuss your recent hospitalization/operation.  - Keep dressings dry for 48 hours after which you may remove dressing and cleanse with soap and water in the shower (no scrubbing). Leave the steri strips (white tape) on your incisions, they will fall off on their own.  Do not pick at steri strips. Run water and soap over incision sites and pat dry (no scrubbing). No submerging your incision sites in water (i.e. no swimming or baths) for 2-3 weeks and avoid exposing the area to jets/streams of water.   - You can resume your normal activities as tolerated, but avoid heavy (>15lb.) lifting and strenuous exercise for 4-6 weeks.    - You were prescribed percocet (oxycodone-acetaminophen) for pain, take these only as needed.  Your pain should subside over the next few days.  While taking narcotic pain medications, you should not drive or operate heavy machinery. If taking with other medications containing acetaminophen (Tylenol), reduce your dose of percocet so that you  do not exceed 3000mg of acetaminophen per day.  - Narcotic pain medicine tends to cause constipation, we recommend taking a stool softener and/or gentle laxative to avoid this problem.  If stools become loose, discontinue stool softener/laxative.    - You were also prescribed a muscle relaxer. You were prescribed a tapering dose of steroids to be completed in 5 days.  You were prescribed the antibiotic Cephalexin to complete a 7 day course for uvulitis. You completed 3 days of Ceftriaxone without cross reaction with your penicillin allergy.    - You should resume all home medications unless otherwise instructed.    - If you experience fevers, chills, increasing abdominal pain, nausea, vomiting, inability to pass stool or gas, bleeding, or any other acute symptoms, please call your doctor and report to the emergency room immediately for further management.

## 2021-10-27 NOTE — PROGRESS NOTE ADULT - SUBJECTIVE AND OBJECTIVE BOX
ENT PROGRESS NOTE    Pt is a 41y M a/w LBP now POD#1 s/p L4-5 MLD; being followed by ENT for uvulitis. Pt seen and examined at bedside this am. Pt doing well, feels like he improved overall. Complaining of some soreness s/p intubation for OR. Pt tolerating PO. To note, pt lying flat in the bed during interview and exam.     REVIEW OF SYSTEMS   [x] A ten-point review of systems was otherwise negative except as noted.    Allergies  penicillin (Unknown)    MEDICATIONS:  acetaminophen     Tablet .. 1000 milliGRAM(s) Oral every 8 hours  cefTRIAXone   IVPB 2000 milliGRAM(s) IV Intermittent every 24 hours  chlorhexidine 0.12% Liquid 15 milliLiter(s) Oral Mucosa two times a day  dexAMETHasone  Injectable 6 milliGRAM(s) IV Push every 8 hours  heparin   Injectable 5000 Unit(s) SubCutaneous every 8 hours  influenza   Vaccine 0.5 milliLiter(s) IntraMuscular once  levothyroxine 100 MICROGram(s) Oral daily  lidocaine   4% Patch 1 Patch Transdermal daily  methocarbamol 750 milliGRAM(s) Oral three times a day  oxyCODONE    IR 5 milliGRAM(s) Oral every 4 hours PRN  oxyCODONE    IR 10 milliGRAM(s) Oral every 4 hours PRN  pantoprazole    Tablet 40 milliGRAM(s) Oral before breakfast  polyethylene glycol 3350 17 Gram(s) Oral daily  senna 2 Tablet(s) Oral at bedtime    Vital Signs Last 24 Hrs  T(C): 36.3 (27 Oct 2021 05:51), Max: 36.8 (26 Oct 2021 14:00)  T(F): 97.4 (27 Oct 2021 05:51), Max: 98.2 (26 Oct 2021 14:00)  HR: 69 (27 Oct 2021 05:51) (69 - 113)  BP: 118/89 (27 Oct 2021 05:51) (118/89 - 140/82)  BP(mean): 105 (26 Oct 2021 12:15) (105 - 105)  RR: 16 (27 Oct 2021 05:51) (10 - 19)  SpO2: 95% (27 Oct 2021 05:51) (94% - 98%)      10-26 @ 07:01  -  10-27 @ 07:00  --------------------------------------------------------  IN:    Lactated Ringers: 150 mL  Total IN: 150 mL    OUT:  Total OUT: 0 mL    Total NET: 150 mL      10-27 @ 07:01  -  10-27 @ 09:43  --------------------------------------------------------  IN:  Total IN: 0 mL    OUT:    Voided (mL): 500 mL  Total OUT: 500 mL    Total NET: -500 mL      PHYSICAL EXAM:  GEN: NAD. No drooling or pooling of secretions. No stridor. Good vocal quality, no hoarseness.   NEURO: Awake and alert   SKIN: Good color, non diaphoretic  HEENT: NC/AT; Oral mucosa pink and moist. No erythema or edema noted to tongue, FOM. Uvula midline, no erythema noted to uvula, no active bleeding.    RESP: Non-labored breathing  CARDIO: +S1/S2  ABDO: Soft, NT    LABS:  CBC-                        16.4   16.37 )-----------( 262      ( 27 Oct 2021 07:19 )             49.8     BMP/CMP-  27 Oct 2021 07:19    136    |  99     |  21     ----------------------------<  125    5.3     |  24     |  1.1      Ca    9.4        27 Oct 2021 07:19    TPro  6.6    /  Alb  4.3    /  TBili  0.6    /  DBili  x      /  AST  18     /  ALT  20     /  AlkPhos  59     27 Oct 2021 07:19

## 2021-10-27 NOTE — PROGRESS NOTE ADULT - PROVIDER SPECIALTY LIST ADULT
ENT
Hospitalist
Neurosurgery
Hospitalist
Hospitalist
Internal Medicine
Neurosurgery
Internal Medicine
Internal Medicine
Neurosurgery

## 2021-10-27 NOTE — DISCHARGE NOTE PROVIDER - NSDCMRMEDTOKEN_GEN_ALL_CORE_FT
cephalexin 500 mg oral capsule: 1 cap(s) orally 4 times a day   Medrol Dosepak 4 mg oral tablet: follow directions on packet for 5 day course  methocarbamol 750 mg oral tablet: 1 tab(s) orally every 6 hours, As Needed -for muscle spasm   oxycodone-acetaminophen 5 mg-325 mg oral tablet: 1 tab(s) orally every 6 hours, As Needed -for severe pain MDD:4  Synthroid 100 mcg (0.1 mg) oral tablet: 1 tab(s) orally once a day

## 2021-10-27 NOTE — PROGRESS NOTE ADULT - ASSESSMENT
Pt is a 41y M a/w LBP now POD#1 s/p L4-5 MLD; being followed by ENT for uvulitis; clinically improving     ·	Cont with abx as per ID recs   ·	Soft PO diet  ·	Steroid prn   ·	w/d with attng

## 2021-10-27 NOTE — DISCHARGE NOTE PROVIDER - NSDCFUADDAPPT_GEN_ALL_CORE_FT
Call Dr Lima's office for a follow up appointment in 1-2 weeks    Call Dr Ruiz's office for follow up appointment for diagnosis of uvulitis in 1-2 weeks.

## 2021-10-27 NOTE — PROGRESS NOTE ADULT - ASSESSMENT
Pt is a 40yo M w/ PMH of R sciatica, and renal mass s/p partial nephrectomy at [facility] 8/21, and hypothyroidism presented to the ED w/ R. back pain since his nephrectomy, but worsened recently (~2wk ago). During hospital admission, pt has begun to complain of throat pain, mild difficulty swallowing and an episode of hemoptysis. Pt's MRI showed 1.3 cm right paracentral disc extrusion at L4-5 effacing the right lateral recess with posterior displacement of the descending L5 nerve roots, resulting in moderate spinal stenosis and moderate right worse than left foraminal stenosis. CT neck also demonstrated generalized thickening with apparent laceration of the inferior aspect of the uvula possibly representing uvulitis.     #Mod-spinal stenosis w/ foraminal stenosis s/p L4-5 microlumbar diskectomy  -MRI lumbar demonstrated moderate spinal stenosis of L4-L5 w/ mod foraminal stenosis; Lumbar XR neg  -Currently on Robaxin 750mg po TID  -s/w Neurosurg regarding dexamethasone; restarted pt on dexamethasone 6mg IVP q8 for radiculopathy   -Oxycodone and Morphine PRN  -seen by PT; s/w at beside: pt able to walk, but has difficulty sitting.   -per neuro surg, started SQH    #Uvulitis with hemoptysis  -ENT c/s appreciated;   - CT neck demonstrated possible uvulitis; no fluid collection or LAD  -d/c'd Dexamethasone 6mg IVP q8 10/26  -ID consult appreciated; Rocephin 2g IV qd; on d/c de-escalate to keflex 500mg q6 - to complete total 7d abx course. (end date 10/31)    #Hypothyroidism  -Levothyroxine 100mcg qd    #Misc  DVT PPx: SQH  Diet - reg diet  GI PPx; Pantoprazole

## 2021-10-27 NOTE — DISCHARGE NOTE PROVIDER - CARE PROVIDERS DIRECT ADDRESSES
[email or web address]

## 2021-10-28 LAB — SURGICAL PATHOLOGY STUDY: SIGNIFICANT CHANGE UP

## 2021-11-03 DIAGNOSIS — C64.9 MALIGNANT NEOPLASM OF UNSPECIFIED KIDNEY, EXCEPT RENAL PELVIS: ICD-10-CM

## 2021-11-03 DIAGNOSIS — M54.9 DORSALGIA, UNSPECIFIED: ICD-10-CM

## 2021-11-03 DIAGNOSIS — G47.33 OBSTRUCTIVE SLEEP APNEA (ADULT) (PEDIATRIC): ICD-10-CM

## 2021-11-03 DIAGNOSIS — E03.9 HYPOTHYROIDISM, UNSPECIFIED: ICD-10-CM

## 2021-11-03 DIAGNOSIS — M48.061 SPINAL STENOSIS, LUMBAR REGION WITHOUT NEUROGENIC CLAUDICATION: ICD-10-CM

## 2021-11-03 DIAGNOSIS — D72.829 ELEVATED WHITE BLOOD CELL COUNT, UNSPECIFIED: ICD-10-CM

## 2021-11-03 DIAGNOSIS — K12.2 CELLULITIS AND ABSCESS OF MOUTH: ICD-10-CM

## 2021-11-03 DIAGNOSIS — Z98.84 BARIATRIC SURGERY STATUS: ICD-10-CM

## 2021-11-03 DIAGNOSIS — M51.16 INTERVERTEBRAL DISC DISORDERS WITH RADICULOPATHY, LUMBAR REGION: ICD-10-CM

## 2021-11-03 DIAGNOSIS — Z90.5 ACQUIRED ABSENCE OF KIDNEY: ICD-10-CM

## 2021-11-03 DIAGNOSIS — M51.26 OTHER INTERVERTEBRAL DISC DISPLACEMENT, LUMBAR REGION: ICD-10-CM

## 2021-11-03 DIAGNOSIS — E66.9 OBESITY, UNSPECIFIED: ICD-10-CM

## 2021-11-03 DIAGNOSIS — R04.2 HEMOPTYSIS: ICD-10-CM

## 2021-11-04 ENCOUNTER — APPOINTMENT (OUTPATIENT)
Dept: NEUROSURGERY | Facility: CLINIC | Age: 41
End: 2021-11-04
Payer: MEDICAID

## 2021-11-04 PROCEDURE — 99024 POSTOP FOLLOW-UP VISIT: CPT

## 2021-11-04 NOTE — HISTORY OF PRESENT ILLNESS
[FreeTextEntry1] : Mr. Brandon, s/p right L4-5 microdiscectomy on 10/26, presents today overall doing well. Reports of mild right buttock muscle tightness, and he had prior muscle cramps in his right calf which resolve with movement and hydration. Surgical site has dissolvable sutures, clean ,dry and intact. No evidence of infection. \par Currently having home PT.

## 2021-11-10 ENCOUNTER — APPOINTMENT (OUTPATIENT)
Dept: NEUROSURGERY | Facility: CLINIC | Age: 41
End: 2021-11-10
Payer: MEDICAID

## 2021-11-10 DIAGNOSIS — M51.16 INTERVERTEBRAL DISC DISORDERS WITH RADICULOPATHY, LUMBAR REGION: ICD-10-CM

## 2021-11-10 PROCEDURE — 99024 POSTOP FOLLOW-UP VISIT: CPT

## 2021-11-11 PROBLEM — M51.16 LUMBAR DISC HERNIATION WITH RADICULOPATHY: Status: ACTIVE | Noted: 2021-11-04

## 2021-11-11 NOTE — HISTORY OF PRESENT ILLNESS
[FreeTextEntry1] : Mr. Brandon, s/p right L4-5 microdiscectomy on 10/26. Doing well. No complaints. \par Surgical is clean, dry, and intact. No evidence of infection.

## 2021-12-21 ENCOUNTER — OUTPATIENT (OUTPATIENT)
Dept: OUTPATIENT SERVICES | Facility: HOSPITAL | Age: 41
LOS: 1 days | Discharge: HOME | End: 2021-12-21
Payer: COMMERCIAL

## 2021-12-21 DIAGNOSIS — Z98.84 BARIATRIC SURGERY STATUS: Chronic | ICD-10-CM

## 2021-12-21 DIAGNOSIS — R31.9 HEMATURIA, UNSPECIFIED: ICD-10-CM

## 2021-12-21 DIAGNOSIS — M54.50 LOW BACK PAIN, UNSPECIFIED: ICD-10-CM

## 2021-12-21 PROCEDURE — 93970 EXTREMITY STUDY: CPT | Mod: 26

## 2021-12-22 ENCOUNTER — OUTPATIENT (OUTPATIENT)
Dept: OUTPATIENT SERVICES | Facility: HOSPITAL | Age: 41
LOS: 1 days | Discharge: HOME | End: 2021-12-22
Payer: COMMERCIAL

## 2021-12-22 ENCOUNTER — RESULT REVIEW (OUTPATIENT)
Age: 41
End: 2021-12-22

## 2021-12-22 ENCOUNTER — NON-APPOINTMENT (OUTPATIENT)
Age: 41
End: 2021-12-22

## 2021-12-22 DIAGNOSIS — N28.89 OTHER SPECIFIED DISORDERS OF KIDNEY AND URETER: ICD-10-CM

## 2021-12-22 DIAGNOSIS — Z98.84 BARIATRIC SURGERY STATUS: Chronic | ICD-10-CM

## 2021-12-22 DIAGNOSIS — N20.0 CALCULUS OF KIDNEY: ICD-10-CM

## 2021-12-22 PROCEDURE — 74170 CT ABD WO CNTRST FLWD CNTRST: CPT | Mod: 26

## 2022-07-19 ENCOUNTER — OUTPATIENT (OUTPATIENT)
Dept: OUTPATIENT SERVICES | Facility: HOSPITAL | Age: 42
LOS: 1 days | Discharge: HOME | End: 2022-07-19

## 2022-07-19 ENCOUNTER — RESULT REVIEW (OUTPATIENT)
Age: 42
End: 2022-07-19

## 2022-07-19 DIAGNOSIS — Z98.84 BARIATRIC SURGERY STATUS: Chronic | ICD-10-CM

## 2022-07-19 DIAGNOSIS — R10.9 UNSPECIFIED ABDOMINAL PAIN: ICD-10-CM

## 2022-07-19 DIAGNOSIS — N28.89 OTHER SPECIFIED DISORDERS OF KIDNEY AND URETER: ICD-10-CM

## 2022-07-19 PROCEDURE — 74178 CT ABD&PLV WO CNTR FLWD CNTR: CPT | Mod: 26

## 2022-07-25 ENCOUNTER — NON-APPOINTMENT (OUTPATIENT)
Age: 42
End: 2022-07-25

## 2022-07-28 ENCOUNTER — APPOINTMENT (OUTPATIENT)
Dept: UROLOGY | Facility: CLINIC | Age: 42
End: 2022-07-28

## 2022-07-28 VITALS
TEMPERATURE: 98.5 F | RESPIRATION RATE: 16 BRPM | SYSTOLIC BLOOD PRESSURE: 139 MMHG | DIASTOLIC BLOOD PRESSURE: 92 MMHG | HEART RATE: 67 BPM | OXYGEN SATURATION: 98 %

## 2022-07-28 DIAGNOSIS — Z00.00 ENCOUNTER FOR GENERAL ADULT MEDICAL EXAMINATION W/OUT ABNORMAL FINDINGS: ICD-10-CM

## 2022-07-28 PROCEDURE — 99214 OFFICE O/P EST MOD 30 MIN: CPT

## 2022-08-31 NOTE — HISTORY OF PRESENT ILLNESS
[FreeTextEntry1] : Martínez Lay MD\par [address], [place] [address]\par  CC : RCC status post right partal nephrectomy on 7/2021 \par \par \par CC: history of renal cell carcinoma\par \par 41year old former smoker ( 210 Pck/day)  male s/p partial nephrectomy on 7/7/21. He quit smoking four years ago \par \par Doing well since surgery. He was recently diagnosed with low T and has been started on testosterone by another provider. He would like to transition that part of his care to urology. \par  \par \par CT 7/19/2022:\par No recurrence , 6 mm nonobstructing left renal stone \par \par Abdominal exam : well healed surgical scars \par \par PMH : Hypothyroidism \par PSH : Right partial nephrectomy \par FH : None \par Meds: Buproprione

## 2022-08-31 NOTE — ASSESSMENT
[FreeTextEntry1] : Diagnosis : RCC status post right partial nephrectomy\par Plan \par \par 41 year old male s/p right partial nephrectomy, surveillance screening negative for recurrence or mets \par -pathology reviewed with patient \par  -Annual screening \par -Testosterone labs\par -Referral to  for low T management

## 2023-01-17 ENCOUNTER — LABORATORY RESULT (OUTPATIENT)
Age: 43
End: 2023-01-17

## 2023-01-17 ENCOUNTER — APPOINTMENT (OUTPATIENT)
Dept: UROLOGY | Facility: CLINIC | Age: 43
End: 2023-01-17
Payer: COMMERCIAL

## 2023-01-17 VITALS
DIASTOLIC BLOOD PRESSURE: 82 MMHG | SYSTOLIC BLOOD PRESSURE: 147 MMHG | OXYGEN SATURATION: 97 % | TEMPERATURE: 98.3 F | HEART RATE: 72 BPM

## 2023-01-17 PROCEDURE — 52000 CYSTOURETHROSCOPY: CPT

## 2023-01-17 NOTE — ASSESSMENT
[FreeTextEntry1] : Diagnosis: \par \par Microhematuria \par History of renal cancer\par \par Plan: \par \par Cystoscopy\par Cytology\par Repeat UA and CTU in 6 months then follow back up in office.\par \par Testosterone Replacement\par Advised patient to stop using medication as not prescribed by a physician\par Follow up with Dr. Brooks for testosterone management \par \par New Sargent MD, FACS, FRCS \par  of Urology [facility]\par Director of Laparoscopic and Robotic Surgery \par [facility] Director of Urology, [facility] \par Professor of Urology\par \par (Office) \par (Cell)  [phone] \par [email or web address]\par \par \par

## 2023-01-17 NOTE — HISTORY OF PRESENT ILLNESS
[FreeTextEntry1] : Dr. Lay \par [address]\par [facility] [address]\par \par CC: Microscopic Hematuria, History of RCC, Low T \par \par 42 year old former smoker ( 210 Pck/day) male s/p partial nephrectomy on 7/7/21. He quit smoking five years ago.\par Started chewing tobacco 6-8 months ago.\par \par CT 7/19/2022:\par No recurrence , 6 mm nonobstructing left renal stone \par \par Testosterone enanthate (TE) 1 cc weekly, currently getting medication from a friend\par \par UA > 30 RBC/hpf\par \par Yesterday morning reports right upper quadrant discomfort\par No nausea/vomiting\par \par No gross hematuria \par \par PMH : Hypothyroidism \par PSH : Right partial nephrectomy \par FH : None

## 2023-01-18 ENCOUNTER — NON-APPOINTMENT (OUTPATIENT)
Age: 43
End: 2023-01-18

## 2023-01-20 LAB — URINE CYTOLOGY: NORMAL

## 2023-02-07 ENCOUNTER — OUTPATIENT (OUTPATIENT)
Dept: OUTPATIENT SERVICES | Facility: HOSPITAL | Age: 43
LOS: 1 days | End: 2023-02-07
Payer: COMMERCIAL

## 2023-02-07 DIAGNOSIS — Z98.84 BARIATRIC SURGERY STATUS: Chronic | ICD-10-CM

## 2023-02-07 DIAGNOSIS — N28.89 OTHER SPECIFIED DISORDERS OF KIDNEY AND URETER: ICD-10-CM

## 2023-02-07 DIAGNOSIS — Z00.8 ENCOUNTER FOR OTHER GENERAL EXAMINATION: ICD-10-CM

## 2023-02-07 DIAGNOSIS — R31.29 OTHER MICROSCOPIC HEMATURIA: ICD-10-CM

## 2023-02-07 PROCEDURE — 74178 CT ABD&PLV WO CNTR FLWD CNTR: CPT

## 2023-02-07 PROCEDURE — 74178 CT ABD&PLV WO CNTR FLWD CNTR: CPT | Mod: 26

## 2023-02-08 ENCOUNTER — NON-APPOINTMENT (OUTPATIENT)
Age: 43
End: 2023-02-08

## 2023-02-08 DIAGNOSIS — N28.89 OTHER SPECIFIED DISORDERS OF KIDNEY AND URETER: ICD-10-CM

## 2023-02-08 DIAGNOSIS — R31.29 OTHER MICROSCOPIC HEMATURIA: ICD-10-CM

## 2023-04-19 ENCOUNTER — TRANSCRIPTION ENCOUNTER (OUTPATIENT)
Age: 43
End: 2023-04-19

## 2023-04-19 ENCOUNTER — APPOINTMENT (OUTPATIENT)
Dept: UROLOGY | Facility: CLINIC | Age: 43
End: 2023-04-19
Payer: COMMERCIAL

## 2023-04-19 VITALS
DIASTOLIC BLOOD PRESSURE: 86 MMHG | SYSTOLIC BLOOD PRESSURE: 132 MMHG | HEART RATE: 84 BPM | HEIGHT: 73 IN | BODY MASS INDEX: 37.77 KG/M2 | WEIGHT: 285 LBS

## 2023-04-19 DIAGNOSIS — N20.0 CALCULUS OF KIDNEY: ICD-10-CM

## 2023-04-19 DIAGNOSIS — N28.89 OTHER SPECIFIED DISORDERS OF KIDNEY AND URETER: ICD-10-CM

## 2023-04-19 LAB
BILIRUB UR QL STRIP: NORMAL
COLLECTION METHOD: NORMAL
GLUCOSE UR-MCNC: NORMAL
HCG UR QL: 0.2 EU/DL
HGB UR QL STRIP.AUTO: NORMAL
KETONES UR-MCNC: NORMAL
LEUKOCYTE ESTERASE UR QL STRIP: NORMAL
NITRITE UR QL STRIP: NORMAL
PH UR STRIP: 6
PROT UR STRIP-MCNC: NORMAL
SP GR UR STRIP: 1.03

## 2023-04-19 PROCEDURE — 99213 OFFICE O/P EST LOW 20 MIN: CPT

## 2023-04-19 PROCEDURE — 81003 URINALYSIS AUTO W/O SCOPE: CPT | Mod: QW

## 2023-06-28 ENCOUNTER — NON-APPOINTMENT (OUTPATIENT)
Age: 43
End: 2023-06-28

## 2023-06-30 ENCOUNTER — APPOINTMENT (OUTPATIENT)
Dept: UROLOGY | Facility: CLINIC | Age: 43
End: 2023-06-30
Payer: COMMERCIAL

## 2023-06-30 VITALS
SYSTOLIC BLOOD PRESSURE: 138 MMHG | RESPIRATION RATE: 18 BRPM | BODY MASS INDEX: 37.77 KG/M2 | HEART RATE: 104 BPM | WEIGHT: 285 LBS | TEMPERATURE: 97.8 F | OXYGEN SATURATION: 98 % | DIASTOLIC BLOOD PRESSURE: 88 MMHG | HEIGHT: 73 IN

## 2023-06-30 DIAGNOSIS — Z12.5 ENCOUNTER FOR SCREENING FOR MALIGNANT NEOPLASM OF PROSTATE: ICD-10-CM

## 2023-06-30 PROCEDURE — 99214 OFFICE O/P EST MOD 30 MIN: CPT

## 2023-08-03 ENCOUNTER — APPOINTMENT (OUTPATIENT)
Dept: UROLOGY | Facility: CLINIC | Age: 43
End: 2023-08-03
Payer: COMMERCIAL

## 2023-08-03 VITALS
HEART RATE: 83 BPM | HEIGHT: 73 IN | SYSTOLIC BLOOD PRESSURE: 121 MMHG | DIASTOLIC BLOOD PRESSURE: 81 MMHG | WEIGHT: 295 LBS | BODY MASS INDEX: 39.1 KG/M2

## 2023-08-03 PROCEDURE — 99213 OFFICE O/P EST LOW 20 MIN: CPT

## 2023-08-14 PROBLEM — Z12.5 PROSTATE CANCER SCREENING: Status: ACTIVE | Noted: 2023-08-14

## 2023-08-14 NOTE — ADDENDUM
[FreeTextEntry1] : I personally examined the patient and discussed the plan with the Physician Assistant/Nurse Practitioner at the time of the visit. I agree with the assessment and plan as written, unless noted below.

## 2023-08-14 NOTE — ASSESSMENT
[FreeTextEntry1] : This is a 42-year-old male with history of hypogonadism.  With a total testosterone as low as "37.".  There is no available record of this available to review.\par \par He has been on testosterone injections now for approximately 1.5 years.  Prescription was initially prescribed by a medical doctor, and then patient was receiving medication from friends.  He is currently now following up at a testosterone clinic and receiving 1.5 cc of testosterone cypionate weekly.\par \par Patient's symptoms include decreased energy, low libido, and mood swings.\par \par I reviewed patient blood work from 2022.\par I reviewed risks of testosterone replacement therapy with patient including erythrocytosis, elevation and estradiol and blood clots formation, elevation in liver enzymes, enlargement of prostate.\par \par I expressed concerns to patient regards to why his testosterone decreased to a total of 37.  I explained the patient that he will need a full hormone work-up including pituitary hormones.  Patient received his injection today and will get blood work tomorrow and then will get blood work the following week to check his peak levels and his trough levels.\par \par Patient will follow-up with our partner Dr. Sexton to review these labs and for transfer of care for testosterone replacement therapy.\par Goals of care reviewed with patient and patient agrees that he does not want to rely on testosterone injections for the rest of his life if he does not have to.

## 2023-08-14 NOTE — HISTORY OF PRESENT ILLNESS
[FreeTextEntry1] : Leonard is a 42-year-old male history of kidney cancer hx of partial nephrectomy with Dr. Sargent. \par \par He presents to office today for subspecialty consultation for hypogonadism.  As per patient he was diagnosed with low testosterone approximately 2 years ago.  He states that his total testosterone was as low as "37."  Patient states that he was started on testosterone replacement therapy by his primary medical doctor at this time and he is unsure if he had any pituitary work-up.  Patient states that he lost his insurance and was unable to follow-up with his medical doctor and was receiving testosterone medication from a friend of his.  Patient is currently now following up with [facility] and receiving 1.5 cc of testosterone cypionate weekly.\par \par The only labs I have available for review are in 2022.\par At this time patient was reportedly taking testosterone that he was receiving from his friend.\par Total testosterone in 2022 was above the thousand.  Hemoglobin 15 and hematocrit 45.3.\par PSA in 2021 was 1.58 ng/mL.

## 2023-08-21 ENCOUNTER — APPOINTMENT (OUTPATIENT)
Dept: UROLOGY | Facility: CLINIC | Age: 43
End: 2023-08-21
Payer: COMMERCIAL

## 2023-08-21 VITALS
SYSTOLIC BLOOD PRESSURE: 131 MMHG | DIASTOLIC BLOOD PRESSURE: 89 MMHG | HEART RATE: 84 BPM | HEIGHT: 73 IN | BODY MASS INDEX: 38.83 KG/M2 | WEIGHT: 293 LBS

## 2023-08-21 PROCEDURE — 99214 OFFICE O/P EST MOD 30 MIN: CPT

## 2023-08-21 NOTE — END OF VISIT
[FreeTextEntry3] : I, Dr. Sexton, personally performed the evaluation and management (E/M) services for this established patient who presents today with (a) new problem(s)/exacerbation of (an) existing condition(s).  That E/M includes conducting the examination, assessing all new/exacerbated conditions, and establishing a new plan of care.  Today, my ACP, Kali Madison was here to observe my evaluation and management services for this new problem/exacerbated condition to be followed going forward.

## 2023-08-21 NOTE — LETTER HEADER
[FreeTextEntry3] : Sanam Sexton M.D. Director Emeritus of Urology [facility]/[address], [address] [place] [address]  No

## 2023-08-21 NOTE — HISTORY OF PRESENT ILLNESS
[FreeTextEntry1] : Leonard is a 42-year-old male born September 10, 1980 with a history of low testosterone currently being injected by [facility] with 1 cc every week along with an unknown amount of hCG twice a month.  He presents to review his trough/peak values on their protocol and discuss treatment.  There is a possibility that he may want to have children in the future and we discussed how testosterone replacement therapy could permanently impair this ability in the future.  Trough values from 8/4/2023: Total testosterone 699 Free 152.4 Bioavailable 320.1 SHBG 19 Estradiol 21 Peak values from 8/7/2023:  Peak values: Total testosterone 877 Free 207.7 Bioavailable 418.1 SHBG 19 CBC within normal limits Liver function testing within normal limits LH undetectable Prolactin 26.3 Estradiol 43 PSA 1.2

## 2023-08-21 NOTE — LETTER BODY
[Dear  ___] : Dear  [unfilled], [Courtesy Letter:] : I had the pleasure of seeing your patient, [unfilled], in my office today. [Please see my note below.] : Please see my note below. [Sincerely,] : Sincerely, [FreeTextEntry2] : Martínez Lay MD [address] [place] [address]

## 2023-08-21 NOTE — ASSESSMENT
[FreeTextEntry1] : I cannot explain the dosing he is getting I also pointed out to him if his trough is at maximal testosterone why would 1 give him another shot when he is already at maximal.  I also do not have the bioavailable testosterone.  I also cannot explain why his FSH and LH are as good as they are and why his estradiol did not go higher especially given his BMI of 38.9.  I am going to have him get trough and peak, he supposed to get injected this weekend if he gets injected Friday he will get trough on Friday and peak on Monday if he gets injected Saturday he will get trough on Saturday and peak on Monday.  He will then stop everything especially if they are injecting him when he is already at maximal he is probably go pretty high he will come back in we will look at the numbers and discuss his options.  If possible he would like to preserve his fertility in case they decide to go for a surrogate though that is not his primary concern at this point in time.

## 2023-08-21 NOTE — ASSESSMENT
[FreeTextEntry1] : At 1 cc every week plus what ever amount of the ECG they had him on his levels are reasonable.  We reviewed his options he is electing to continue that at this time.  He may be interested in having children and we discussed him having a conversation with his nancy to see what the story may be here.  If he wants children we need to discontinue testosterone ASAP if it is not already too late and start Clomid.  If we get him up to a level where his testosterone is functioning normally we would then see if he has sperm and if he does have him bank sperm, as his nancy cannot have children naturally and they are interested in surrogacy.  After he august sperm we can consider restarting testosterone therapy.  For now he will take 1 cc/week for the next month as he discusses this with his fidaniellee.  Please note his total testosterone level before they started anything was 468 I do not know what the other levels more is very possible that his testicles were functioning normally.  The fact that he has been on hCG hopefully preserving some degree of Lydick cell function will help him get off testosterone through the use of Clomid if he decides to choose that route.  For now, he will follow-up in 6 weeks for review with trough and peak values

## 2023-08-21 NOTE — LETTER HEADER
[FreeTextEntry3] : Sanam Sexton M.D. Director Emeritus of Urology [facility]/[address], [address] [place] [address]

## 2023-08-21 NOTE — PHYSICAL EXAM
[General Appearance - Well Developed] : well developed [General Appearance - Well Nourished] : well nourished [Normal Appearance] : normal appearance [Well Groomed] : well groomed [General Appearance - In No Acute Distress] : no acute distress [Abdomen Soft] : soft [Abdomen Tenderness] : non-tender [Abdomen Hernia] : no hernia was discovered [Costovertebral Angle Tenderness] : no ~M costovertebral angle tenderness [Urethral Meatus] : meatus normal [Penis Abnormality] : normal circumcised penis [Testes Tenderness] : no tenderness of the testes [Heart Rate And Rhythm] : Heart rate and rhythm were normal [Edema] : no peripheral edema [] : no respiratory distress [Respiration, Rhythm And Depth] : normal respiratory rhythm and effort [Exaggerated Use Of Accessory Muscles For Inspiration] : no accessory muscle use [Auscultation Breath Sounds / Voice Sounds] : lungs were clear to auscultation bilaterally [Oriented To Time, Place, And Person] : oriented to person, place, and time [Affect] : the affect was normal [Mood] : the mood was normal [Not Anxious] : not anxious [Normal Station and Gait] : the gait and station were normal for the patient's age [FreeTextEntry1] : Fields of View were normal

## 2023-08-21 NOTE — HISTORY OF PRESENT ILLNESS
[None] : no symptoms [FreeTextEntry1] : Hx of TRT by PCP then switched to forned supply htne 3 months ago went to [facility] who gave him 1 cc per week, plus HCG ? dose .twice a month.  Please note he tells me that the [facility] was getting blood every 6 weeks but they were drawing blood before injection and no one ever measured a peak.  He brought up the blood levels from just before the shot 6 weeks ago the total testosterone was at maximum of 830 interestingly his prolactin with his stress levels of 24.4 but his LH was normal at 5.9, hemoglobin A1c was 5.7 and FSH was 3.5 with an estradiol of 21.4  pt also with 5 mm left renal stone ct 02/08/2023 stable in years  saw dr giordano re transfer of care as he lives in  who sent him to Dr Mancilla for TRT who felt that this needs more in depth evaluation espcially with his hx of cancer and the ? of why pituitary isn't functioning. Note he is aware that testosterone suppresses sperm production can be permanent.  His partner had a double mastectomy and the risk of pregnancy is too high.  If they ever wanted children, they would need a surrogate.  Right now they are not thinking of that.  Please note he says no one ever discussed pituitary modulation to see if they can get his testosterone where it belongs without Shutting down his sperm production.  As well he says no one ever checked his pituitary to find out why in such a young age male he was having castrate levels of testosterone which means something was not right either Leydig cell failure or pituitary dysfunction.   He is here now for subspecialty care.  Please note he had trough a week after injection and peak in the day after injection on July 30 and July 8 Testosterone went from 738 down to 581 Free testosterone went from 157.4 down to 132.7 Bioavailable testosterone went from 330.6 down to 290.2 Sex hormone binding globulin was 20 and 16 With albumin at 4.6 and 4.8 Liver function tests were normal Hemoglobin was 16.4 with a platelet count of 200,000 LH was undetectable Prolactin was 23.9 Estradiol was 30 was undetectable at less than 15 PSA was 1.6/19%

## 2023-08-21 NOTE — LETTER BODY
[Dear  ___] : Dear  [unfilled], [Courtesy Letter:] : I had the pleasure of seeing your patient, [unfilled], in my office today. [Please see my note below.] : Please see my note below. [Sincerely,] : Sincerely, [FreeTextEntry2] : Martínez Lay MD [address] [place] [address]

## 2023-09-22 ENCOUNTER — APPOINTMENT (OUTPATIENT)
Dept: UROLOGY | Facility: CLINIC | Age: 43
End: 2023-09-22
Payer: COMMERCIAL

## 2023-09-22 VITALS
OXYGEN SATURATION: 98 % | BODY MASS INDEX: 39.76 KG/M2 | RESPIRATION RATE: 18 BRPM | HEIGHT: 73 IN | TEMPERATURE: 98.4 F | DIASTOLIC BLOOD PRESSURE: 80 MMHG | WEIGHT: 300 LBS | SYSTOLIC BLOOD PRESSURE: 131 MMHG | HEART RATE: 85 BPM

## 2023-09-22 PROCEDURE — 99214 OFFICE O/P EST MOD 30 MIN: CPT

## 2023-12-14 ENCOUNTER — APPOINTMENT (OUTPATIENT)
Dept: UROLOGY | Facility: CLINIC | Age: 43
End: 2023-12-14
Payer: COMMERCIAL

## 2023-12-14 VITALS
WEIGHT: 300 LBS | TEMPERATURE: 97.7 F | DIASTOLIC BLOOD PRESSURE: 94 MMHG | RESPIRATION RATE: 18 BRPM | OXYGEN SATURATION: 98 % | HEART RATE: 88 BPM | HEIGHT: 73 IN | BODY MASS INDEX: 39.76 KG/M2 | SYSTOLIC BLOOD PRESSURE: 146 MMHG

## 2023-12-14 DIAGNOSIS — C64.9 MALIGNANT NEOPLASM OF UNSPECIFIED KIDNEY, EXCEPT RENAL PELVIS: ICD-10-CM

## 2023-12-14 PROCEDURE — 99212 OFFICE O/P EST SF 10 MIN: CPT

## 2023-12-17 NOTE — ADDENDUM
[FreeTextEntry1] : Documented by JUAN Oscar acting as a scribe for Dr. Bertha Chandra   All medical record entries made by the Scribe were at my, Dr. Chandra direction and personally dictated by me.  I have reviewed the chart and agree that the record accurately reflects my personal performance of the history, physical exam, procedure and imaging.

## 2023-12-17 NOTE — LETTER BODY
[Dear  ___] : Dear  [unfilled], [Consult Letter:] : I had the pleasure of evaluating your patient, [unfilled]. [Please see my note below.] : Please see my note below. [Sincerely,] : Sincerely, [FreeTextEntry3] : Bertha Chandra MD, FACS

## 2023-12-17 NOTE — ASSESSMENT
[FreeTextEntry1] : 43-year-old with history of right partial nephrectomy in 2021. Most recent CT scan February 2023 CHONG.  Following Dr. Gregg for hormones.   Recommend kidney bladder sonogram now and kidney bladder sonogram in 6 months. Patient to call for results.  He will continue his follow-up with Dr. Gregg.  Recommend that patient get CT scan of abdomen pelvis in 1 year. He can be referred back if any abnormal findings.

## 2023-12-17 NOTE — HISTORY OF PRESENT ILLNESS
[FreeTextEntry1] : 43-year-old, former smoker.  Status post partial nephrectomy July 2021 with Dr. 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  Renal cell carcinoma.  History of microscopic hematuria status post negative workup February 2023 including cystoscopy and CT urography.  He is currently following with Dr. Gregg for hormone treatments.  Presents to office for follow-up for kidney cancer.  Reports feeling well. Denies flank pain, dysuria, gross hematuria.

## 2023-12-18 ENCOUNTER — APPOINTMENT (OUTPATIENT)
Dept: UROLOGY | Facility: CLINIC | Age: 43
End: 2023-12-18
Payer: COMMERCIAL

## 2023-12-18 VITALS
OXYGEN SATURATION: 97 % | RESPIRATION RATE: 17 BRPM | WEIGHT: 305 LBS | BODY MASS INDEX: 40.42 KG/M2 | HEIGHT: 73 IN | SYSTOLIC BLOOD PRESSURE: 140 MMHG | DIASTOLIC BLOOD PRESSURE: 90 MMHG | TEMPERATURE: 97.5 F | HEART RATE: 86 BPM

## 2023-12-18 PROCEDURE — 99214 OFFICE O/P EST MOD 30 MIN: CPT

## 2023-12-18 NOTE — LETTER BODY
[Dear  ___] : Dear  [unfilled], [Courtesy Letter:] : I had the pleasure of seeing your patient, [unfilled], in my office today. [Please see my note below.] : Please see my note below. [Sincerely,] : Sincerely, [FreeTextEntry2] : Martínez Lay MD [address] [place] [address]

## 2023-12-18 NOTE — LETTER HEADER
[FreeTextEntry3] : Sanam Sexton MD [address] [place] [address], [address] [place] [id number]

## 2023-12-18 NOTE — ASSESSMENT
[FreeTextEntry1] : His numbers are fine though I do not understand why the estradiol 1 tablet is probably sampling error He understands the ultrasound report will come back to Dr. Chandra if he has not discussed that with him by the time he comes back to see me in 3 months I will go over with him then  I will renew the testosterone he will stay at 1 mL every 7 days Will get blood in 2-1/2 and see me in 3 months

## 2023-12-18 NOTE — PHYSICAL EXAM
[Normal Appearance] : normal appearance [Well Groomed] : well groomed [General Appearance - In No Acute Distress] : no acute distress [Edema] : no peripheral edema [] : no respiratory distress [Respiration, Rhythm And Depth] : normal respiratory rhythm and effort [Exaggerated Use Of Accessory Muscles For Inspiration] : no accessory muscle use [Auscultation Breath Sounds / Voice Sounds] : lungs were clear to auscultation bilaterally [Abdomen Soft] : soft [Abdomen Tenderness] : non-tender [Costovertebral Angle Tenderness] : no ~M costovertebral angle tenderness [Normal Station and Gait] : the gait and station were normal for the patient's age [No Focal Deficits] : no focal deficits [Oriented To Time, Place, And Person] : oriented to person, place, and time [Affect] : the affect was normal [Mood] : the mood was normal [Not Anxious] : not anxious [No Palpable Adenopathy] : no palpable adenopathy [FreeTextEntry1] : BMI = 40.24

## 2023-12-18 NOTE — HISTORY OF PRESENT ILLNESS
[FreeTextEntry1] : Leonard is a 42-year-old male born September 10, 1980 with a history of low testosterone now injecting 1 cc every wednesday.  He was last seen September 22, 2023 at which point we reviewed that testosterone injection when done over time can make someone permanently sterile and he decided that he would still like to continue.  The blood tests showed a wobbly total and bioavailable were high normal and he was feeling okay we decided to leave him on this get trough and peak in 2-1/2 and see me in 3 months.   he saw Dr. Chandra on December 14, 2023 He recommended renal bladder ultrasound now and in 6 months, CAT scan in a year and then if there is any abnormality he can be referred back.  He did not get to the ultrasound as this was just requested 4 days ago and he is already has the request for the second ultrasound in June 2024  Trough and peak bloods were done on December 6 and December 8 with him on 1 mL of testosterone cypionate every 7 days  Total testosterone went from 634 up to 741 Free testosterone went from 123.4 up to 148 Bioavailable testosterone went from 253.8 up to 310.7 Sex hormone binding globulin was 22 with albumin at 4.5 and went to 22 and 4.6 Estradiol was 37 and went down to 19 which does not make any sense  PSA was 1.3/23%

## 2024-03-15 ENCOUNTER — APPOINTMENT (OUTPATIENT)
Dept: UROLOGY | Facility: CLINIC | Age: 44
End: 2024-03-15

## 2024-03-18 ENCOUNTER — APPOINTMENT (OUTPATIENT)
Dept: UROLOGY | Facility: CLINIC | Age: 44
End: 2024-03-18
Payer: COMMERCIAL

## 2024-03-18 VITALS
DIASTOLIC BLOOD PRESSURE: 97 MMHG | RESPIRATION RATE: 16 BRPM | TEMPERATURE: 97.8 F | BODY MASS INDEX: 40.42 KG/M2 | WEIGHT: 305 LBS | HEART RATE: 93 BPM | HEIGHT: 73 IN | SYSTOLIC BLOOD PRESSURE: 153 MMHG | OXYGEN SATURATION: 94 %

## 2024-03-18 DIAGNOSIS — R39.9 UNSPECIFIED SYMPTOMS AND SIGNS INVOLVING THE GENITOURINARY SYSTEM: ICD-10-CM

## 2024-03-18 PROCEDURE — 99214 OFFICE O/P EST MOD 30 MIN: CPT

## 2024-03-18 PROCEDURE — G2211 COMPLEX E/M VISIT ADD ON: CPT | Mod: NC,1L

## 2024-03-18 NOTE — LETTER BODY
[Dear  ___] : Dear  [unfilled], [Courtesy Letter:] : I had the pleasure of seeing your patient, [unfilled], in my office today. [Please see my note below.] : Please see my note below. [Sincerely,] : Sincerely, [FreeTextEntry2] : Martínez Lay MD [address] [place] [address]

## 2024-03-18 NOTE — LETTER HEADER
[FreeTextEntry3] : Sanam Sexton MD [facility], [address] [place] [address]

## 2024-03-18 NOTE — ASSESSMENT
[FreeTextEntry1] : His blood work makes no sense.  However he has been on this dose for a while and has been stable some we will keep him at the same dose that he has been on for many years, and have him obtain repeat trough/peak values.  Depending on those results we may need to adjust his dose.  Instead of waiting 3 months he get these in 4 to 6 weeks and see him in 8 weeks for review.  Concerning his urination, he had an adverse reaction to tamsulosin where he felt weak and dizzy.  He has not tried alfuzosin although, there is drug to drug interaction between his ADHD medication and alfuzosin.  The other alternative is Rapaflo.  He will contact his psychiatrist and discuss this with him.  He will let us know what he wants to do in terms of starting any alpha blockers.  If he cannot tolerate alpha blockers and he needs to stay on the medication from a psychiatric viewpoint and he may want to consider a surgical procedure.  We briefly discussed UroLift and mention the possibility of others especially as he is 43 and would rather minimize his pills for the rest of his life.

## 2024-03-18 NOTE — HISTORY OF PRESENT ILLNESS
[FreeTextEntry1] : Leonard is a 42-year-old male born September 10, 1980 last seen December 18, 2023 who we have been following for low testosterone.  Currently he is well-managed on 1 cc of testosterone every week.  He reports good efficacy and denies any adverse events.  He reports that he has been managed on Strattera for ADHD, with good efficacy however, he is noticing difficulty with urination.  He presents today to review his trough/peak values.  Trough values from 2/28/2024: Total testosterone 1167 Free testosterone 270 Bioavailable 580 Estradiol 40 SHBG 21   Peak values 3/1/2024: Total testosterone 861 Free 109 9.5 Bioavailable 436.4 SHBG 18 Liver function testing within normal limits CBC within normal limits Estradiol 41  [Urinary Frequency] : urinary frequency [Straining] : straining [Weak Stream] : weak stream

## 2024-03-18 NOTE — PHYSICAL EXAM
[General Appearance - Well Nourished] : well nourished [Normal Appearance] : normal appearance [General Appearance - Well Developed] : well developed [Well Groomed] : well groomed [General Appearance - In No Acute Distress] : no acute distress [Respiration, Rhythm And Depth] : normal respiratory rhythm and effort [Exaggerated Use Of Accessory Muscles For Inspiration] : no accessory muscle use [] : no rash [Normal Station and Gait] : the gait and station were normal for the patient's age [No Focal Deficits] : no focal deficits [Oriented To Time, Place, And Person] : oriented to person, place, and time [Not Anxious] : not anxious [Mood] : the mood was normal [Affect] : the affect was normal [de-identified] : Bilateral lower extremity mild edema

## 2024-05-16 ENCOUNTER — APPOINTMENT (OUTPATIENT)
Dept: UROLOGY | Facility: CLINIC | Age: 44
End: 2024-05-16
Payer: COMMERCIAL

## 2024-05-16 PROCEDURE — 99214 OFFICE O/P EST MOD 30 MIN: CPT

## 2024-05-16 PROCEDURE — G2211 COMPLEX E/M VISIT ADD ON: CPT | Mod: NC,1L

## 2024-05-16 RX ORDER — TAMSULOSIN HYDROCHLORIDE 0.4 MG/1
0.4 CAPSULE ORAL
Qty: 30 | Refills: 1 | Status: COMPLETED | COMMUNITY
Start: 2021-12-21 | End: 2024-05-16

## 2024-05-16 RX ORDER — SYRINGE, DISPOSABLE, 1 ML
1 ML SYRINGE, EMPTY DISPOSABLE MISCELLANEOUS
Qty: 20 | Refills: 0 | Status: DISCONTINUED | COMMUNITY
Start: 2023-09-22 | End: 2024-05-16

## 2024-05-16 RX ORDER — TESTOSTERONE CYPIONATE 200 MG/ML
200 INJECTION, SOLUTION INTRAMUSCULAR
Qty: 1 | Refills: 0 | Status: DISCONTINUED | COMMUNITY
Start: 2023-08-21 | End: 2024-05-16

## 2024-05-16 RX ORDER — LEVOTHYROXINE SODIUM 0.17 MG/1
TABLET ORAL
Refills: 0 | Status: ACTIVE | COMMUNITY

## 2024-05-16 RX ORDER — ATOMOXETINE 100 MG/1
CAPSULE ORAL
Refills: 0 | Status: ACTIVE | COMMUNITY

## 2024-05-16 RX ORDER — NEEDLES, DISPOSABLE 25GX5/8"
22G X 1-1/2" NEEDLE, DISPOSABLE MISCELLANEOUS
Qty: 20 | Refills: 0 | Status: DISCONTINUED | COMMUNITY
Start: 2023-09-22 | End: 2024-05-16

## 2024-05-16 RX ORDER — LEVOTHYROXINE SODIUM 0.17 MG/1
TABLET ORAL
Refills: 0 | Status: COMPLETED | COMMUNITY
End: 2024-05-16

## 2024-05-16 NOTE — HISTORY OF PRESENT ILLNESS
[FreeTextEntry1] : Leonard is a 42-year-old male born September 10, 1980 last seen 03/18/2024  who we have been following for low testosterone.  Currently he is well-managed on 1 cc of testosterone every week. He reports good efficacy and denies any adverse events.  He reported that his Strattera for ADHD, and his difficulty with urination resolved except that his ADHD kicked in.  They may stop the Strattera and switch to a different medication that may not have as much of an effect on the urination..  He presents today to review his trough/peak value, telling me that his cousin has offered to carry their baby if they can conceive on embryo with IVF and he understands that testosterone is a mental contraceptive and that we will be staying on it we will permanent if becomes.  Blood test were done on April 24 April 26 Total testosterone was 776 and went up to 937.9 ([phone]) Bioavailable testosterone was 304.2 () and went up to 468 Estradiol was 22.2 and went up to 38.1 Sex hormone binding globulin was 21.9 and 19.7 Hemoglobin was 16.5 with a platelet count of 203,000 Liver function tests were normal [Urinary Frequency] : urinary frequency [Straining] : straining [Weak Stream] : weak stream

## 2024-05-16 NOTE — ASSESSMENT
[FreeTextEntry1] : Will going to need to stop the testosterone, put him on Clomid and hope that it helps.  He understands that he may crash because it takes a while for the Clomid to kick in and in fact may not kick in.  Depending on what happens we may give him hCG to jumpstart the testicle and possibly use Arimidex to keep the estradiol low so we get his LH and FSH up is much as possible.  He realizes there is no set protocol for this this is a little off label use will be done up to 4 and hopefully will work but there is no guarantee  To summarize stop the testosterone, start Clomid 50 mg every other day Blood tests in 3 and see me in 4 weeks

## 2024-05-16 NOTE — LETTER BODY
[Dear  ___] : Dear  [unfilled], [Courtesy Letter:] : I had the pleasure of seeing your patient, [unfilled], in my office today. [Please see my note below.] : Please see my note below. [Sincerely,] : Sincerely, [FreeTextEntry2] : Martínez Lay MD [address] [place] [address]

## 2024-05-16 NOTE — LETTER HEADER
[FreeTextEntry3] : Sanam Sexton MD [facility], [address] [place] [address]

## 2024-05-16 NOTE — PHYSICAL EXAM
[General Appearance - Well Developed] : well developed [General Appearance - Well Nourished] : well nourished [Normal Appearance] : normal appearance [Well Groomed] : well groomed [General Appearance - In No Acute Distress] : no acute distress [Heart Rate And Rhythm] : heart rate and rhythm were normal [Edema] : no peripheral edema [] : no respiratory distress [Respiration, Rhythm And Depth] : normal respiratory rhythm and effort [Exaggerated Use Of Accessory Muscles For Inspiration] : no accessory muscle use [Auscultation Breath Sounds / Voice Sounds] : lungs were clear to auscultation bilaterally [Abdomen Soft] : soft [Abdomen Tenderness] : non-tender [Costovertebral Angle Tenderness] : no ~M costovertebral angle tenderness [Normal Station and Gait] : the gait and station were normal for the patient's age [No Focal Deficits] : no focal deficits [Oriented To Time, Place, And Person] : oriented to person, place, and time [Affect] : the affect was normal [Mood] : the mood was normal [Not Anxious] : not anxious [FreeTextEntry1] : bmi = 40.24 [de-identified] : Fields of View were normal

## 2024-06-20 ENCOUNTER — APPOINTMENT (OUTPATIENT)
Dept: UROLOGY | Facility: CLINIC | Age: 44
End: 2024-06-20
Payer: COMMERCIAL

## 2024-06-20 VITALS
RESPIRATION RATE: 18 BRPM | HEIGHT: 73 IN | SYSTOLIC BLOOD PRESSURE: 127 MMHG | DIASTOLIC BLOOD PRESSURE: 79 MMHG | OXYGEN SATURATION: 97 % | WEIGHT: 302 LBS | HEART RATE: 73 BPM | BODY MASS INDEX: 40.02 KG/M2

## 2024-06-20 DIAGNOSIS — R68.82 DECREASED LIBIDO: ICD-10-CM

## 2024-06-20 DIAGNOSIS — R79.89 OTHER SPECIFIED ABNORMAL FINDINGS OF BLOOD CHEMISTRY: ICD-10-CM

## 2024-06-20 DIAGNOSIS — E29.1 TESTICULAR HYPOFUNCTION: ICD-10-CM

## 2024-06-20 DIAGNOSIS — R53.82 CHRONIC FATIGUE, UNSPECIFIED: ICD-10-CM

## 2024-06-20 PROCEDURE — 99214 OFFICE O/P EST MOD 30 MIN: CPT

## 2024-06-20 PROCEDURE — G2211 COMPLEX E/M VISIT ADD ON: CPT | Mod: NC

## 2024-06-20 RX ORDER — CLOMIPHENE CITRATE 50 MG/1
50 TABLET ORAL
Qty: 15 | Refills: 1 | Status: ACTIVE | COMMUNITY
Start: 2024-05-16 | End: 1900-01-01

## 2024-06-20 NOTE — LETTER BODY
[Dear  ___] : Dear  [unfilled], [Courtesy Letter:] : I had the pleasure of seeing your patient, [unfilled], in my office today. [Please see my note below.] : Please see my note below. [Sincerely,] : Sincerely, [FreeTextEntry2] : Martínez Lay MD [address] [place] [address]

## 2024-06-20 NOTE — LETTER HEADER
[FreeTextEntry3] : Sanam Sexton MD [facility], [address] [place] [address]

## 2024-06-20 NOTE — ASSESSMENT
[FreeTextEntry1] : He will restart Clomid 50 every other day, get blood work in 3 weeks, and follow-up in 4 weeks for review.  Depending on what the testosterone level shows we may switch him to Clomid daily versus adding hCG.  Follow-up in 4 weeks to review.  He understands that I do not know if the dose should be higher or lower as without bloods I can tell him so essentially we are where we were for 5 weeks ago.

## 2024-06-20 NOTE — PHYSICAL EXAM
[General Appearance - Well Developed] : well developed [General Appearance - Well Nourished] : well nourished [Normal Appearance] : normal appearance [Well Groomed] : well groomed [General Appearance - In No Acute Distress] : no acute distress [Edema] : no peripheral edema [Respiration, Rhythm And Depth] : normal respiratory rhythm and effort [Exaggerated Use Of Accessory Muscles For Inspiration] : no accessory muscle use [Normal Station and Gait] : the gait and station were normal for the patient's age [] : no rash [No Focal Deficits] : no focal deficits [Oriented To Time, Place, And Person] : oriented to person, place, and time [Affect] : the affect was normal [Mood] : the mood was normal [Not Anxious] : not anxious [de-identified] : Visual fields intact

## 2024-06-20 NOTE — HISTORY OF PRESENT ILLNESS
[Urinary Frequency] : urinary frequency [Straining] : straining [Weak Stream] : weak stream [FreeTextEntry1] : Leonard is a 42-year-old male born September 10, 1980, who we have been following for low testosterone.  He has been managed on 1 cc of testosterone weekly however, at his last visit he and he stated that his girlfriend are considering having a child.  We discussed the risk of long-term testosterone with relation to sperm production.  After thorough review he elected to begin Clomid 50 mg 1 tab every other day.  On this medication he notes a significant decrease in his energy and libido.  He ran out of medication a week ago and did not get blood work yet.  [Currently Experiencing ___] :  [unfilled] [Erectile Dysfunction] : Erectile Dysfunction [Fatigue] : fatigue

## 2024-07-03 NOTE — PATIENT PROFILE ADULT - ARRIVAL FROM
"Subjective:      Patient ID: Donte Silva is a 51 y.o. male.    Vitals:  height is 5' 11" (1.803 m) and weight is 145.2 kg (320 lb) (abnormal). His oral temperature is 98.6 °F (37 °C). His blood pressure is 148/82 (abnormal) and his pulse is 71. His respiration is 20 and oxygen saturation is 96%.     Chief Complaint: Hand Pain    51 year old male c/o left hand pain that started 2 weeks ago after a fall.  Pain is improving but impaired ROM and swelling continues to left 5th finger. Worried about gout; no past medical history of it.     Hand Pain   His dominant hand is their right hand. The incident occurred more than 1 week ago. The injury mechanism was a fall and a direct blow. The pain is present in the left hand. The quality of the pain is described as aching. The pain does not radiate. The pain is at a severity of 4/10. The pain is moderate. The pain has been Intermittent since the incident. Pertinent negatives include no muscle weakness, numbness or tingling. The symptoms are aggravated by movement and lifting. He has tried nothing for the symptoms.       Constitution: Negative for chills, fatigue and fever.   Musculoskeletal:  Positive for pain, trauma, joint pain, joint swelling and abnormal ROM of joint.   Skin:  Negative for erythema.   Neurological:  Negative for numbness.      Objective:     Physical Exam   Constitutional: He is oriented to person, place, and time. He appears well-developed.   HENT:   Head: Normocephalic and atraumatic. Head is without abrasion, without contusion and without laceration.   Ears:   Right Ear: External ear normal.   Left Ear: External ear normal.   Nose: Nose normal.   Mouth/Throat: Oropharynx is clear and moist and mucous membranes are normal.   Eyes: Conjunctivae, EOM and lids are normal. Pupils are equal, round, and reactive to light.   Neck: Trachea normal and phonation normal. Neck supple.   Cardiovascular: Normal rate, regular rhythm and normal heart sounds. "   Pulmonary/Chest: Effort normal and breath sounds normal. No stridor. No respiratory distress.   Musculoskeletal:      Left hand: Left little finger: Exhibits decreased ROM, swelling and tenderness (no erythema or warmth).   Neurological: He is alert and oriented to person, place, and time.   Skin: Skin is warm, dry, intact and no rash. Capillary refill takes less than 2 seconds. No abrasion, No burn, No bruising, No erythema and No ecchymosis   Psychiatric: His speech is normal and behavior is normal. Judgment and thought content normal.   Nursing note and vitals reviewed.      Assessment:     1. Hand injury, left, initial encounter        Plan:     EXAMINATION:  THREE VIEWS OF THE LEFT HAND     CLINICAL HISTORY:  Unspecified injury of left wrist, hand and finger(s), initial encounter     TECHNIQUE:  AP, lateral and oblique views of the left hand     COMPARISON:  None.     FINDINGS:  Examination is limited by the overlap of the digits at the level of the metacarpals on the lateral view.  Mild dorsal soft tissue swelling is present.  Three views of the left hand demonstrate no definite acute fracture or dislocation.     Impression:     Limited examination.  As above described.        Electronically signed by:Clara Olivares  Date:                                            07/03/2024  Time:                                           17:18      Hand injury, left, initial encounter  -     XR HAND COMPLETE 3 VIEW LEFT; Future; Expected date: 07/03/2024  -     WRIST BRACE FOR HOME USE      Gave patient knuckle orthosis for immobilization. RICE encouraged. Tylenol/ibuprofen. Educated on s/s of gout and when to f/u. Advised PCP recheck in 1 week.         Discussed results/diagnosis/plan with patient in clinic. Strict precautions given to patient to monitor for worsening signs and symptoms. Advised to follow up with PCP or specialist.  Explained side effects of medications prescribed with patient and informed him/her to  discontinue use if he/she has any side effects and to inform UC or PCP if this occurs. All questions answered. Strict ED verses clinic return precautions stressed and given in depth. Advised if symptoms worsens of fail to improve he/she should go to the Emergency Room. Discharge and follow-up instructions given verbally/printed with the patient who expressed understanding and willingness to comply with my recommendations. Patient voiced understanding and in agreement with current treatment plan. Patient exits the exam room in no acute distress. Conversant and engaged during discharge discussion, verbalized understanding.       Home

## 2024-07-31 ENCOUNTER — APPOINTMENT (OUTPATIENT)
Dept: UROLOGY | Facility: CLINIC | Age: 44
End: 2024-07-31

## 2024-08-07 ENCOUNTER — APPOINTMENT (OUTPATIENT)
Dept: UROLOGY | Facility: CLINIC | Age: 44
End: 2024-08-07

## 2024-08-07 PROCEDURE — 99214 OFFICE O/P EST MOD 30 MIN: CPT

## 2024-08-07 PROCEDURE — G2211 COMPLEX E/M VISIT ADD ON: CPT | Mod: NC

## 2024-08-07 NOTE — PHYSICAL EXAM
[General Appearance - Well Developed] : well developed [General Appearance - Well Nourished] : well nourished [Normal Appearance] : normal appearance [Well Groomed] : well groomed [General Appearance - In No Acute Distress] : no acute distress [Heart Rate And Rhythm] : heart rate and rhythm were normal [Edema] : no peripheral edema [] : no respiratory distress [Respiration, Rhythm And Depth] : normal respiratory rhythm and effort [Exaggerated Use Of Accessory Muscles For Inspiration] : no accessory muscle use [Auscultation Breath Sounds / Voice Sounds] : lungs were clear to auscultation bilaterally [Abdomen Soft] : soft [Abdomen Tenderness] : non-tender [Costovertebral Angle Tenderness] : no ~M costovertebral angle tenderness [Normal Station and Gait] : the gait and station were normal for the patient's age [No Focal Deficits] : no focal deficits [Oriented To Time, Place, And Person] : oriented to person, place, and time [Mood] : the mood was normal [Affect] : the affect was normal [Not Anxious] : not anxious [de-identified] : Fields of View were normal  [FreeTextEntry1] : bmi = 39.84

## 2024-08-07 NOTE — HISTORY OF PRESENT ILLNESS
[FreeTextEntry1] : Leonard is a 43-year-old male born September 10, 1980, who we have been following for low testosterone. He ran out of meds He was to restart Clomid 50 every other day, get blood work in 3 weeks, and follow-up in 4 weeks for review. He had jury duty  and was rescheduled to today. Depending on what the testosterone level shows we may switch him to Clomid daily versus adding hCG.  He is extremely fatigued and has no libido  bloods were done 07/11/2024 with him on Clomid 50 mg every other day Lipids showed an LDL of 107 but were otherwise okay Comprehensive metabolic panel was normal TSH was 4.07 Hemoglobin was 14.4 with a platelet count of 156,000 Total testosterone 308 Free testosterone was 32.2 Bioavailable testosterone was 64.8 Sex hormone binding globulin was 42 with albumin of 4.4 Liver function tests were normal FSH was 4.6 LH was 4.8 Estradiol was 18 [Currently Experiencing ___] :  [unfilled] [Urinary Frequency] : urinary frequency [Straining] : straining [Weak Stream] : weak stream [Erectile Dysfunction] : Erectile Dysfunction [Fatigue] : fatigue

## 2024-08-07 NOTE — ASSESSMENT
[FreeTextEntry1] : He is going from overdosed to now having testosterone that is about half the lower limit of normal.  The problem may suggest to come a long time to get on the medication on a steady basis get blood in it to get back in here.  I am going to double the Clomid to 50 mg a day he will get blood in 3 weeks and see me in a month if that is not good we will have to add an hCG until his body kicks in

## 2024-08-07 NOTE — LETTER HEADER
[FreeTextEntry3] : Sanam Sexton MD [facility]1 [facility], [address] [place] [address]

## 2024-08-07 NOTE — LETTER BODY
[Dear  ___] : Dear  [unfilled], [Courtesy Letter:] : I had the pleasure of seeing your patient, [unfilled], in my office today. [Please see my note below.] : Please see my note below. [Sincerely,] : Sincerely, [FreeTextEntry2] : Martínez Lay MD [address] [place] [address]

## 2024-09-03 ENCOUNTER — APPOINTMENT (OUTPATIENT)
Dept: UROLOGY | Facility: HOSPITAL | Age: 44
End: 2024-09-03

## 2024-09-04 ENCOUNTER — APPOINTMENT (OUTPATIENT)
Dept: UROLOGY | Facility: CLINIC | Age: 44
End: 2024-09-04
Payer: COMMERCIAL

## 2024-09-04 VITALS
WEIGHT: 301 LBS | SYSTOLIC BLOOD PRESSURE: 121 MMHG | HEART RATE: 76 BPM | HEIGHT: 73 IN | BODY MASS INDEX: 39.89 KG/M2 | DIASTOLIC BLOOD PRESSURE: 78 MMHG | TEMPERATURE: 98.3 F

## 2024-09-04 DIAGNOSIS — E29.1 TESTICULAR HYPOFUNCTION: ICD-10-CM

## 2024-09-04 PROCEDURE — 99214 OFFICE O/P EST MOD 30 MIN: CPT

## 2024-09-04 PROCEDURE — G2211 COMPLEX E/M VISIT ADD ON: CPT | Mod: NC

## 2024-09-04 NOTE — ASSESSMENT
[FreeTextEntry1] : His numbers except for the bioavailable testosterone which is close to normal were all normal this is the best he has been in a while I do not think he is feeling as good as he would like to be as he is not on testosterone about hopefully he will be able to stop making sperm soon and time will tell. He is not yet ready to donate sperm as his cousins wife who was going to carry their baby is pregnant.  We do not need to test the sperm right now but he still wants to keep the option open so we will stay with the Clomid, check his blood in 2-1/2 and see him in 3 months.  I do not want him to stop the medication I do not want him to run out if he runs into trouble he will call if he cannot get through he will email me.

## 2024-09-04 NOTE — HISTORY OF PRESENT ILLNESS
[FreeTextEntry1] : Leonard is a 43-year-old male born September 10, 1980, who we have been following for low testosterone. He was last seen 08/07/2024. At which time we found that he went from being overdosed to being underdosed and unless he is on a consistent regimen I really will not know what to do.  We decided he will comply taking the Clomid now back to 50 mg a day get blood in 3 weeks and see me in a month.  If he is not a good level we will consider adding an hCG.  He is feeling better than he has been he still not where he would like to be.  The blood test on August 29, 2024 with him taking Clomid 50 mg/day Total testosterone was 408 Free testosterone was 50.7 Bioavailable testosterone was 106.4 (110-575) Sex hormone binding globulin was 35 Albumin was 4.6 Liver function tests were normal Hemoglobin was 14.9 with a platelet count of 180,000 FSH was 5.6 LH was 4.4 Estradiol was 22 [Currently Experiencing ___] :  [unfilled] [Urinary Frequency] : urinary frequency [Straining] : straining [Weak Stream] : weak stream [Erectile Dysfunction] : Erectile Dysfunction [Fatigue] : fatigue

## 2024-09-04 NOTE — LETTER HEADER
[FreeTextEntry3] : Sanam Sexton MD [facility], [address] [place] [address]

## 2024-09-04 NOTE — PHYSICAL EXAM
[General Appearance - Well Developed] : well developed [General Appearance - Well Nourished] : well nourished [Normal Appearance] : normal appearance [Well Groomed] : well groomed [General Appearance - In No Acute Distress] : no acute distress [Heart Rate And Rhythm] : heart rate and rhythm were normal [Edema] : no peripheral edema [] : no respiratory distress [Respiration, Rhythm And Depth] : normal respiratory rhythm and effort [Exaggerated Use Of Accessory Muscles For Inspiration] : no accessory muscle use [Auscultation Breath Sounds / Voice Sounds] : lungs were clear to auscultation bilaterally [Abdomen Soft] : soft [Abdomen Tenderness] : non-tender [Costovertebral Angle Tenderness] : no ~M costovertebral angle tenderness [Normal Station and Gait] : the gait and station were normal for the patient's age [No Focal Deficits] : no focal deficits [Oriented To Time, Place, And Person] : oriented to person, place, and time [Affect] : the affect was normal [Mood] : the mood was normal [Not Anxious] : not anxious [FreeTextEntry1] : bmi = 39.71 [de-identified] : Fields of View were normal

## 2024-09-04 NOTE — LETTER BODY
[Dear  ___] : Dear  [unfilled], [Courtesy Letter:] : I had the pleasure of seeing your patient, [unfilled], in my office today. [Please see my note below.] : Please see my note below. [Sincerely,] : Sincerely, [FreeTextEntry2] : Martínez Lay MD [address] [place] [address]

## 2024-12-05 ENCOUNTER — APPOINTMENT (OUTPATIENT)
Dept: UROLOGY | Facility: CLINIC | Age: 44
End: 2024-12-05
Payer: COMMERCIAL

## 2024-12-05 VITALS
DIASTOLIC BLOOD PRESSURE: 75 MMHG | WEIGHT: 290 LBS | HEART RATE: 81 BPM | TEMPERATURE: 98 F | SYSTOLIC BLOOD PRESSURE: 131 MMHG | BODY MASS INDEX: 37.22 KG/M2 | HEIGHT: 74 IN

## 2024-12-05 DIAGNOSIS — R68.82 DECREASED LIBIDO: ICD-10-CM

## 2024-12-05 PROCEDURE — 99214 OFFICE O/P EST MOD 30 MIN: CPT

## 2024-12-05 PROCEDURE — G2211 COMPLEX E/M VISIT ADD ON: CPT | Mod: NC

## 2024-12-05 RX ORDER — SYRINGE W-NEEDLE,DISPOSAB,3 ML 23GX1"
23G X 1-1/2" SYRINGE, EMPTY DISPOSABLE MISCELLANEOUS
Qty: 15 | Refills: 0 | Status: ACTIVE | COMMUNITY
Start: 2024-12-05 | End: 1900-01-01

## 2024-12-05 RX ORDER — NEEDLES, DISPOSABLE 25GX5/8"
18G X 1" NEEDLE, DISPOSABLE MISCELLANEOUS
Qty: 4 | Refills: 2 | Status: ACTIVE | COMMUNITY
Start: 2024-12-05 | End: 1900-01-01

## 2024-12-09 ENCOUNTER — APPOINTMENT (OUTPATIENT)
Dept: UROLOGY | Facility: CLINIC | Age: 44
End: 2024-12-09
Payer: COMMERCIAL

## 2024-12-09 VITALS
HEIGHT: 74 IN | OXYGEN SATURATION: 92 % | SYSTOLIC BLOOD PRESSURE: 137 MMHG | DIASTOLIC BLOOD PRESSURE: 99 MMHG | RESPIRATION RATE: 18 BRPM | WEIGHT: 290 LBS | HEART RATE: 86 BPM | BODY MASS INDEX: 37.22 KG/M2

## 2024-12-09 DIAGNOSIS — N46.9 MALE INFERTILITY, UNSPECIFIED: ICD-10-CM

## 2024-12-09 DIAGNOSIS — E29.1 TESTICULAR HYPOFUNCTION: ICD-10-CM

## 2024-12-09 PROCEDURE — 99212 OFFICE O/P EST SF 10 MIN: CPT

## 2024-12-09 RX ORDER — CHORIONIC GONADOTROPIN 10000 UNIT
10000 KIT INTRAMUSCULAR
Qty: 3 | Refills: 0 | Status: ACTIVE | COMMUNITY
Start: 2024-12-05 | End: 1900-01-01

## 2024-12-11 ENCOUNTER — APPOINTMENT (OUTPATIENT)
Dept: ORTHOPEDIC SURGERY | Facility: CLINIC | Age: 44
End: 2024-12-11
Payer: COMMERCIAL

## 2024-12-11 PROBLEM — M72.2 PLANTAR FASCIITIS, LEFT: Status: ACTIVE | Noted: 2024-12-11

## 2024-12-11 PROCEDURE — 99203 OFFICE O/P NEW LOW 30 MIN: CPT

## 2024-12-11 PROCEDURE — 73630 X-RAY EXAM OF FOOT: CPT | Mod: LT

## 2024-12-18 ENCOUNTER — APPOINTMENT (OUTPATIENT)
Dept: ORTHOPEDIC SURGERY | Facility: CLINIC | Age: 44
End: 2024-12-18
Payer: COMMERCIAL

## 2024-12-18 DIAGNOSIS — M72.2 PLANTAR FASCIAL FIBROMATOSIS: ICD-10-CM

## 2024-12-18 PROCEDURE — 99214 OFFICE O/P EST MOD 30 MIN: CPT

## 2024-12-19 RX ORDER — GONADOTROPHIN, CHORIONIC 5000 UNIT
5000 KIT INTRAMUSCULAR
Qty: 5 | Refills: 0 | Status: ACTIVE | COMMUNITY
Start: 2024-12-13 | End: 1900-01-01

## 2024-12-23 ENCOUNTER — APPOINTMENT (OUTPATIENT)
Dept: UROLOGY | Facility: CLINIC | Age: 44
End: 2024-12-23

## 2025-01-21 NOTE — PRE-OP CHECKLIST - RESPIRATORY RATE (BREATHS/MIN)
17 Detail Level: Zone Render In Strict Bullet Format?: No Discontinue Regimen: Ketoconazole cream Plan: Use swabs on chest and back as well. Use retinol 2-3x a week if too drying, okay to moisturize with non comedogenic moisturizer after using retinol Continue Regimen: Clindamycin swabs to face, chest and back \\nTretinoin cream on back , walnut size

## 2025-02-26 ENCOUNTER — APPOINTMENT (OUTPATIENT)
Dept: ORTHOPEDIC SURGERY | Facility: CLINIC | Age: 45
End: 2025-02-26
Payer: COMMERCIAL

## 2025-02-26 DIAGNOSIS — M72.2 PLANTAR FASCIAL FIBROMATOSIS: ICD-10-CM

## 2025-02-26 PROCEDURE — 20550 NJX 1 TENDON SHEATH/LIGAMENT: CPT | Mod: LT

## 2025-02-26 PROCEDURE — 99214 OFFICE O/P EST MOD 30 MIN: CPT | Mod: 25

## 2025-03-24 ENCOUNTER — APPOINTMENT (OUTPATIENT)
Dept: UROLOGY | Facility: CLINIC | Age: 45
End: 2025-03-24

## 2025-04-14 ENCOUNTER — APPOINTMENT (OUTPATIENT)
Facility: CLINIC | Age: 45
End: 2025-04-14

## 2025-04-21 ENCOUNTER — APPOINTMENT (OUTPATIENT)
Facility: CLINIC | Age: 45
End: 2025-04-21
Payer: COMMERCIAL

## 2025-04-21 DIAGNOSIS — M72.2 PLANTAR FASCIAL FIBROMATOSIS: ICD-10-CM

## 2025-04-21 PROCEDURE — 99214 OFFICE O/P EST MOD 30 MIN: CPT

## 2025-04-24 ENCOUNTER — APPOINTMENT (OUTPATIENT)
Dept: UROLOGY | Facility: CLINIC | Age: 45
End: 2025-04-24
Payer: COMMERCIAL

## 2025-04-24 VITALS
HEART RATE: 90 BPM | WEIGHT: 315 LBS | OXYGEN SATURATION: 93 % | BODY MASS INDEX: 40.43 KG/M2 | SYSTOLIC BLOOD PRESSURE: 160 MMHG | DIASTOLIC BLOOD PRESSURE: 85 MMHG | RESPIRATION RATE: 18 BRPM | HEIGHT: 74 IN

## 2025-04-24 DIAGNOSIS — N52.9 MALE ERECTILE DYSFUNCTION, UNSPECIFIED: ICD-10-CM

## 2025-04-24 DIAGNOSIS — M72.2 PLANTAR FASCIAL FIBROMATOSIS: ICD-10-CM

## 2025-04-24 PROCEDURE — G2211 COMPLEX E/M VISIT ADD ON: CPT | Mod: NC

## 2025-04-24 PROCEDURE — 99214 OFFICE O/P EST MOD 30 MIN: CPT

## 2025-04-24 RX ORDER — AZELASTINE 137 UG/1
137 SPRAY, METERED NASAL
Qty: 30 | Refills: 0 | Status: ACTIVE | COMMUNITY
Start: 2025-03-23

## 2025-04-24 RX ORDER — VILOXAZINE HYDROCHLORIDE 200 MG/1
200 CAPSULE, EXTENDED RELEASE ORAL
Qty: 90 | Refills: 0 | Status: ACTIVE | COMMUNITY
Start: 2025-02-18

## 2025-04-24 RX ORDER — AZITHROMYCIN 250 MG/1
250 TABLET, FILM COATED ORAL
Qty: 6 | Refills: 0 | Status: COMPLETED | COMMUNITY
Start: 2024-12-31

## 2025-04-24 RX ORDER — BENZONATATE 200 MG/1
200 CAPSULE ORAL
Qty: 15 | Refills: 0 | Status: COMPLETED | COMMUNITY
Start: 2024-12-31

## 2025-04-24 RX ORDER — TADALAFIL 5 MG/1
5 TABLET ORAL
Qty: 12 | Refills: 1 | Status: ACTIVE | OUTPATIENT
Start: 2025-04-24

## 2025-04-24 RX ORDER — FLUTICASONE PROPIONATE 50 UG/1
50 SPRAY, METERED NASAL
Qty: 16 | Refills: 0 | Status: ACTIVE | COMMUNITY
Start: 2024-11-04

## 2025-04-24 RX ORDER — IBUPROFEN 600 MG/1
600 TABLET, FILM COATED ORAL
Qty: 20 | Refills: 0 | Status: COMPLETED | COMMUNITY
Start: 2024-12-31

## 2025-05-27 DIAGNOSIS — M72.2 PLANTAR FASCIAL FIBROMATOSIS: ICD-10-CM

## 2025-05-29 ENCOUNTER — APPOINTMENT (OUTPATIENT)
Dept: ORTHOPEDIC SURGERY | Facility: HOSPITAL | Age: 45
End: 2025-05-29

## 2025-05-29 ENCOUNTER — TRANSCRIPTION ENCOUNTER (OUTPATIENT)
Age: 45
End: 2025-05-29

## 2025-05-29 ENCOUNTER — OUTPATIENT (OUTPATIENT)
Dept: OUTPATIENT SERVICES | Facility: HOSPITAL | Age: 45
LOS: 1 days | Discharge: HOME | End: 2025-05-29
Payer: COMMERCIAL

## 2025-05-29 VITALS
TEMPERATURE: 98 F | HEART RATE: 71 BPM | DIASTOLIC BLOOD PRESSURE: 91 MMHG | SYSTOLIC BLOOD PRESSURE: 129 MMHG | OXYGEN SATURATION: 98 % | WEIGHT: 302.03 LBS | HEIGHT: 73 IN | RESPIRATION RATE: 17 BRPM

## 2025-05-29 VITALS — HEART RATE: 65 BPM | RESPIRATION RATE: 18 BRPM | SYSTOLIC BLOOD PRESSURE: 126 MMHG | DIASTOLIC BLOOD PRESSURE: 79 MMHG

## 2025-05-29 DIAGNOSIS — Z98.890 OTHER SPECIFIED POSTPROCEDURAL STATES: Chronic | ICD-10-CM

## 2025-05-29 DIAGNOSIS — Z90.5 ACQUIRED ABSENCE OF KIDNEY: Chronic | ICD-10-CM

## 2025-05-29 DIAGNOSIS — Z98.84 BARIATRIC SURGERY STATUS: Chronic | ICD-10-CM

## 2025-05-29 DIAGNOSIS — M72.2 PLANTAR FASCIAL FIBROMATOSIS: ICD-10-CM

## 2025-05-29 PROCEDURE — 27687 REVISION OF CALF TENDON: CPT | Mod: LT

## 2025-05-29 PROCEDURE — C9399: CPT

## 2025-05-29 PROCEDURE — 20550 NJX 1 TENDON SHEATH/LIGAMENT: CPT | Mod: LT

## 2025-05-29 RX ORDER — ACETAMINOPHEN 500 MG/5ML
1000 LIQUID (ML) ORAL ONCE
Refills: 0 | Status: DISCONTINUED | OUTPATIENT
Start: 2025-05-29 | End: 2025-05-29

## 2025-05-29 RX ORDER — HYDROMORPHONE/SOD CHLOR,ISO/PF 2 MG/10 ML
0.25 SYRINGE (ML) INJECTION
Refills: 0 | Status: DISCONTINUED | OUTPATIENT
Start: 2025-05-29 | End: 2025-05-29

## 2025-05-29 RX ORDER — ONDANSETRON HCL/PF 4 MG/2 ML
4 VIAL (ML) INJECTION ONCE
Refills: 0 | Status: DISCONTINUED | OUTPATIENT
Start: 2025-05-29 | End: 2025-05-29

## 2025-05-29 RX ORDER — ACETAMINOPHEN 500 MG/1
500 TABLET, COATED ORAL
Qty: 30 | Refills: 0 | Status: ACTIVE | COMMUNITY
Start: 2025-05-27 | End: 1900-01-01

## 2025-05-29 RX ORDER — CLOMIPHENE CITRATE 50 MG/1
1 TABLET ORAL
Refills: 0 | DISCHARGE

## 2025-05-29 RX ORDER — OXYCODONE HYDROCHLORIDE 30 MG/1
5 TABLET ORAL ONCE
Refills: 0 | Status: DISCONTINUED | OUTPATIENT
Start: 2025-05-29 | End: 2025-05-29

## 2025-05-29 RX ORDER — CLINDAMYCIN HYDROCHLORIDE 150 MG/1
150 CAPSULE ORAL EVERY 6 HOURS
Qty: 12 | Refills: 0 | Status: ACTIVE | COMMUNITY
Start: 2025-05-27 | End: 1900-01-01

## 2025-05-29 RX ORDER — IBUPROFEN 800 MG/1
800 TABLET, FILM COATED ORAL
Qty: 90 | Refills: 2 | Status: ACTIVE | COMMUNITY
Start: 2025-05-27 | End: 1900-01-01

## 2025-05-29 RX ORDER — SODIUM CHLORIDE 9 G/1000ML
1000 INJECTION, SOLUTION INTRAVENOUS
Refills: 0 | Status: DISCONTINUED | OUTPATIENT
Start: 2025-05-29 | End: 2025-05-29

## 2025-05-29 NOTE — BRIEF OPERATIVE NOTE - NSICDXBRIEFPROCEDURE_GEN_ALL_CORE_FT
PROCEDURES:  Gastrocnemius recession 29-May-2025 08:16:36  John Ortiz  Injection of plantar fascia of left foot 29-May-2025 08:16:59  John Ortiz

## 2025-05-29 NOTE — ASU DISCHARGE PLAN (ADULT/PEDIATRIC) - FINANCIAL ASSISTANCE
[facility] provides services at a reduced cost to those who are determined to be eligible through [facility]’s financial assistance program. Information regarding [facility]’s financial assistance program can be found by going to [email or web address] or by calling [phone].

## 2025-05-29 NOTE — ASU PATIENT PROFILE, ADULT - NSICDXPASTSURGICALHX_GEN_ALL_CORE_FT
PAST SURGICAL HISTORY:  H/O bariatric surgery gastric sleeve    History of partial nephrectomy right    Previous back surgery

## 2025-05-29 NOTE — ASU PATIENT PROFILE, ADULT - FALL HARM RISK - PT AGE POPULATION HIDDEN
Problem: Activity/Exercise Intolerance  Goal: Patient will tolerate activity/exercise  Outcome: Outcome Met, Continue evaluating goal progress toward completion  Intervention: Progress activity per Cardiac Rehab protocol  Intervention Status  Done  Intervention: Progressive graded ambulation  Intervention Status  Done  Intervention: Collaborate with nursing to ensure ambulation 4-6 times per day  Intervention Status  Done  Intervention: Upper and lower extremity calisthenics per Cardiac Rehab protocol  Intervention Status  Done  Intervention: Monitor and document progressive activity response  Intervention Status  Done  Intervention: Encourage hourly incentive spirometry, cough and deep breathe  Intervention Status  Done         Adult

## 2025-05-29 NOTE — ASU DISCHARGE PLAN (ADULT/PEDIATRIC) - ***IN THE EVENT THAT YOU DEVELOP A COMPLICATION AND YOU ARE UNABLE TO REACH YOUR OWN PHYSICIAN, YOU MAY CONTACT:
For employment physical:   Refer to your Human Resource Department for tests or immunizations required for employment.  This does not replace your annual wellness exam with your primary doctor.  It is recommended to see your primary physician for health management and wellness and screenings.  Continue regular follow up with your primary or you can call [phone] for assistance in choosing a primary /family physician.      Statement Selected

## 2025-06-05 DIAGNOSIS — Z85.528 PERSONAL HISTORY OF OTHER MALIGNANT NEOPLASM OF KIDNEY: ICD-10-CM

## 2025-06-05 DIAGNOSIS — E03.9 HYPOTHYROIDISM, UNSPECIFIED: ICD-10-CM

## 2025-06-05 DIAGNOSIS — M72.2 PLANTAR FASCIAL FIBROMATOSIS: ICD-10-CM

## 2025-06-05 DIAGNOSIS — G47.33 OBSTRUCTIVE SLEEP APNEA (ADULT) (PEDIATRIC): ICD-10-CM

## 2025-06-05 DIAGNOSIS — Z90.5 ACQUIRED ABSENCE OF KIDNEY: ICD-10-CM

## 2025-06-05 DIAGNOSIS — Z98.84 BARIATRIC SURGERY STATUS: ICD-10-CM

## 2025-06-06 ENCOUNTER — APPOINTMENT (OUTPATIENT)
Dept: ORTHOPEDIC SURGERY | Facility: CLINIC | Age: 45
End: 2025-06-06
Payer: COMMERCIAL

## 2025-06-06 PROCEDURE — 99024 POSTOP FOLLOW-UP VISIT: CPT

## 2025-06-16 ENCOUNTER — APPOINTMENT (OUTPATIENT)
Facility: CLINIC | Age: 45
End: 2025-06-16

## 2025-06-19 ENCOUNTER — APPOINTMENT (OUTPATIENT)
Dept: UROLOGY | Facility: CLINIC | Age: 45
End: 2025-06-19
Payer: COMMERCIAL

## 2025-06-19 VITALS
SYSTOLIC BLOOD PRESSURE: 138 MMHG | HEIGHT: 74 IN | RESPIRATION RATE: 17 BRPM | TEMPERATURE: 98 F | WEIGHT: 311 LBS | DIASTOLIC BLOOD PRESSURE: 83 MMHG | BODY MASS INDEX: 39.91 KG/M2 | OXYGEN SATURATION: 97 % | HEART RATE: 84 BPM

## 2025-06-19 PROBLEM — R86.8 ASTHENOSPERMIA: Status: ACTIVE | Noted: 2025-06-19

## 2025-06-19 PROCEDURE — G2211 COMPLEX E/M VISIT ADD ON: CPT | Mod: NC

## 2025-06-19 PROCEDURE — 99214 OFFICE O/P EST MOD 30 MIN: CPT

## 2025-06-19 RX ORDER — TADALAFIL 5 MG/1
5 TABLET ORAL
Qty: 30 | Refills: 2 | Status: ACTIVE | COMMUNITY
Start: 2025-06-19 | End: 1900-01-01

## 2025-07-21 ENCOUNTER — APPOINTMENT (OUTPATIENT)
Facility: CLINIC | Age: 45
End: 2025-07-21
Payer: COMMERCIAL

## 2025-07-21 DIAGNOSIS — M72.2 PLANTAR FASCIAL FIBROMATOSIS: ICD-10-CM

## 2025-07-21 PROCEDURE — 99024 POSTOP FOLLOW-UP VISIT: CPT

## 2025-09-15 ENCOUNTER — APPOINTMENT (OUTPATIENT)
Dept: UROLOGY | Facility: CLINIC | Age: 45
End: 2025-09-15
Payer: COMMERCIAL

## 2025-09-15 VITALS
OXYGEN SATURATION: 96 % | SYSTOLIC BLOOD PRESSURE: 129 MMHG | HEART RATE: 87 BPM | WEIGHT: 311 LBS | HEIGHT: 74 IN | DIASTOLIC BLOOD PRESSURE: 79 MMHG | BODY MASS INDEX: 39.91 KG/M2

## 2025-09-15 DIAGNOSIS — E29.1 TESTICULAR HYPOFUNCTION: ICD-10-CM

## 2025-09-15 DIAGNOSIS — R86.8 OTHER ABNORMAL FINDINGS IN SPECIMENS FROM MALE GENITAL ORGANS: ICD-10-CM

## 2025-09-15 PROCEDURE — 99214 OFFICE O/P EST MOD 30 MIN: CPT

## 2025-09-15 PROCEDURE — G2211 COMPLEX E/M VISIT ADD ON: CPT | Mod: NC

## 2025-09-15 RX ORDER — TESTOSTERONE CYPIONATE 200 MG/ML
200 INJECTION, SOLUTION INTRAMUSCULAR
Qty: 10 | Refills: 0 | Status: ACTIVE | COMMUNITY
Start: 2025-09-15 | End: 1900-01-01

## 2025-09-15 RX ORDER — METHYLPHENIDATE HYDROCHLORIDE 18 MG/1
18 TABLET, EXTENDED RELEASE ORAL
Refills: 0 | Status: ACTIVE | COMMUNITY